# Patient Record
Sex: FEMALE | Race: WHITE | URBAN - METROPOLITAN AREA
[De-identification: names, ages, dates, MRNs, and addresses within clinical notes are randomized per-mention and may not be internally consistent; named-entity substitution may affect disease eponyms.]

---

## 2018-02-10 ENCOUNTER — EMERGENCY (EMERGENCY)
Facility: HOSPITAL | Age: 33
LOS: 0 days | Discharge: HOME | End: 2018-02-11

## 2018-02-10 VITALS
HEART RATE: 132 BPM | OXYGEN SATURATION: 97 % | DIASTOLIC BLOOD PRESSURE: 76 MMHG | SYSTOLIC BLOOD PRESSURE: 141 MMHG | RESPIRATION RATE: 18 BRPM | TEMPERATURE: 102 F

## 2018-02-10 DIAGNOSIS — J02.9 ACUTE PHARYNGITIS, UNSPECIFIED: ICD-10-CM

## 2018-02-10 DIAGNOSIS — M79.1 MYALGIA: ICD-10-CM

## 2018-02-10 DIAGNOSIS — Z79.899 OTHER LONG TERM (CURRENT) DRUG THERAPY: ICD-10-CM

## 2018-02-10 DIAGNOSIS — R50.9 FEVER, UNSPECIFIED: ICD-10-CM

## 2018-02-10 PROBLEM — Z00.00 ENCOUNTER FOR PREVENTIVE HEALTH EXAMINATION: Status: ACTIVE | Noted: 2018-02-10

## 2018-02-11 VITALS
DIASTOLIC BLOOD PRESSURE: 50 MMHG | OXYGEN SATURATION: 96 % | SYSTOLIC BLOOD PRESSURE: 96 MMHG | TEMPERATURE: 98 F | RESPIRATION RATE: 18 BRPM

## 2018-02-11 RX ORDER — SODIUM CHLORIDE 9 MG/ML
1000 INJECTION INTRAMUSCULAR; INTRAVENOUS; SUBCUTANEOUS
Qty: 0 | Refills: 0 | Status: COMPLETED | OUTPATIENT
Start: 2018-02-11 | End: 2018-02-11

## 2018-02-11 RX ORDER — ACETAMINOPHEN 500 MG
650 TABLET ORAL ONCE
Qty: 0 | Refills: 0 | Status: COMPLETED | OUTPATIENT
Start: 2018-02-11 | End: 2018-02-11

## 2018-02-11 RX ORDER — KETOROLAC TROMETHAMINE 30 MG/ML
30 SYRINGE (ML) INJECTION ONCE
Qty: 0 | Refills: 0 | Status: DISCONTINUED | OUTPATIENT
Start: 2018-02-11 | End: 2018-02-11

## 2018-02-11 RX ADMIN — Medication 30 MILLIGRAM(S): at 00:44

## 2018-02-11 RX ADMIN — SODIUM CHLORIDE 1000 MILLILITER(S): 9 INJECTION INTRAMUSCULAR; INTRAVENOUS; SUBCUTANEOUS at 01:30

## 2018-02-11 RX ADMIN — Medication 650 MILLIGRAM(S): at 00:43

## 2018-02-11 RX ADMIN — SODIUM CHLORIDE 1000 MILLILITER(S): 9 INJECTION INTRAMUSCULAR; INTRAVENOUS; SUBCUTANEOUS at 00:44

## 2018-02-11 NOTE — ED PROVIDER NOTE - NS ED ROS FT
Constitutional: + fever, chills  Heent: see hpi  Cardiovascular: (-) syncope  Integumentary: (-) rash  Musculoskeltal: + body aches, pains  Neurological: (-) altered mental status

## 2018-02-11 NOTE — ED PROVIDER NOTE - OBJECTIVE STATEMENT
32 yold female to ED with no signif med hx c/o fever, chills, sore throat, runny nose, body aches and pain x 1 day; pt currently being see with daughter with similar sx; pt denies sob, chest pain, n/v/diarrhea or back pain; did not get flu vaccine this season;

## 2018-02-11 NOTE — ED PROVIDER NOTE - ENMT, MLM
Airway patent, Nasal mucosa boggy. Mouth with normal mucosa. Throat has no vesicles, no oropharyngeal exudates and uvula is midline. + pharyngeal erythema

## 2018-02-11 NOTE — ED PROVIDER NOTE - MEDICAL DECISION MAKING DETAILS
pt with flu like sx; hydrated, antipyretics given; instructed in fever and hydration management; tamiflu prescribed;

## 2019-02-18 ENCOUNTER — EMERGENCY (EMERGENCY)
Facility: HOSPITAL | Age: 34
LOS: 0 days | Discharge: HOME | End: 2019-02-18
Attending: EMERGENCY MEDICINE | Admitting: EMERGENCY MEDICINE

## 2019-02-18 VITALS
HEART RATE: 69 BPM | TEMPERATURE: 96 F | RESPIRATION RATE: 18 BRPM | DIASTOLIC BLOOD PRESSURE: 71 MMHG | SYSTOLIC BLOOD PRESSURE: 117 MMHG | OXYGEN SATURATION: 97 %

## 2019-02-18 DIAGNOSIS — K08.89 OTHER SPECIFIED DISORDERS OF TEETH AND SUPPORTING STRUCTURES: ICD-10-CM

## 2019-02-18 DIAGNOSIS — K02.9 DENTAL CARIES, UNSPECIFIED: ICD-10-CM

## 2019-02-18 DIAGNOSIS — Z79.899 OTHER LONG TERM (CURRENT) DRUG THERAPY: ICD-10-CM

## 2019-02-18 NOTE — ED PROVIDER NOTE - PROGRESS NOTE DETAILS
32 yo F with poor dental hygiene and multiple dental caries presenting with pain and caries to tooth #20. No systemic sx. No s/sx of marija angina, abscess. Discussed risk and benefit of dental block but pt declined block and refused any tylenol/motrin here. Sent rx for augmentin to pharmacy for gum swelling and given dental clinic f/u tomorrow. Pt verbalized understanding and was agreeable with plan to dc home.

## 2019-02-18 NOTE — ED PROVIDER NOTE - PHYSICAL EXAMINATION
CONSTITUTIONAL: well developed, nontoxic appearing, in no acute distress, speaking in full sentences  SKIN: warm, dry, no rash, cap refill < 2 seconds  HEENT: normocephalic, atraumatic, no conjunctival erythema, moist mucous membranes, patent airway, multiple dental caries, poor dental hygiene, significant dental caries on tooth #20, pain with palpation of #20 with surrounding gum swelling, no fluctuance, no induration, no tenderness/swelling along mandibular floor, no loose teeth  NECK: supple, no masses, no cervical lymphadenopathy  CV:  regular rate, regular rhythm  RESP: normal work of breathing  ABD: soft, nontender, nondistended, no rebound, no guarding  MSK: normal ROM, no cyanosis, no edema  NEURO: alert, oriented, grossly unremarkable  PSYCH: cooperative, appropriate

## 2019-02-18 NOTE — ED PROVIDER NOTE - OBJECTIVE STATEMENT
32 yo F with no PMHx who presents with dental pain x past several days, worse with chewing. Has some swelling around gum but no pus or recent trauma. Has been taking tylenol and advil without relief in pain. No f, ch, drooling, difficulty swallowing, n, v. Has not seen a dentist in a long time and tried to go to dental clinic today but it was closed for holiday so came to ED.

## 2019-02-18 NOTE — ED PROVIDER NOTE - NSFOLLOWUPCLINICS_GEN_ALL_ED_FT
[facility]  Dental  [address] [place] [id number]  Phone: [phone]  Fax:   Follow Up Time: 1-3 Days

## 2019-02-18 NOTE — ED PROVIDER NOTE - NS ED ROS FT
GEN:  no fever, no chills, no fatigue  NEURO:  no headache, no dizziness  ENT: no sore throat, no runny nose, + teeth pain  NECK: no neck pain  CV:  no chest pain, no SOB  RESP:  no dyspnea, no cough  GI:  no nausea, no vomiting, no abdominal pain  :  no dysuria, no urinary frequency, no hematuria  MSK:  no joint pain, no edema  SKIN:  no rash, no bruising

## 2020-02-29 ENCOUNTER — TRANSCRIPTION ENCOUNTER (OUTPATIENT)
Age: 35
End: 2020-02-29

## 2022-03-23 ENCOUNTER — INPATIENT (INPATIENT)
Facility: HOSPITAL | Age: 37
LOS: 0 days | Discharge: HOME | End: 2022-03-24
Attending: OBSTETRICS & GYNECOLOGY | Admitting: OBSTETRICS & GYNECOLOGY

## 2022-03-23 VITALS
SYSTOLIC BLOOD PRESSURE: 101 MMHG | RESPIRATION RATE: 18 BRPM | HEART RATE: 67 BPM | WEIGHT: 182.98 LBS | HEIGHT: 63 IN | DIASTOLIC BLOOD PRESSURE: 70 MMHG | TEMPERATURE: 98 F

## 2022-03-23 DIAGNOSIS — Z98.890 OTHER SPECIFIED POSTPROCEDURAL STATES: Chronic | ICD-10-CM

## 2022-03-23 LAB
AMPHET UR-MCNC: NEGATIVE — SIGNIFICANT CHANGE UP
APPEARANCE UR: CLEAR — SIGNIFICANT CHANGE UP
BARBITURATES UR SCN-MCNC: NEGATIVE — SIGNIFICANT CHANGE UP
BASOPHILS # BLD AUTO: 0.01 K/UL — SIGNIFICANT CHANGE UP (ref 0–0.2)
BASOPHILS NFR BLD AUTO: 0.1 % — SIGNIFICANT CHANGE UP (ref 0–1)
BENZODIAZ UR-MCNC: NEGATIVE — SIGNIFICANT CHANGE UP
BILIRUB UR-MCNC: NEGATIVE — SIGNIFICANT CHANGE UP
BLD GP AB SCN SERPL QL: SIGNIFICANT CHANGE UP
BUPRENORPHINE SCREEN, URINE RESULT: NEGATIVE — SIGNIFICANT CHANGE UP
COCAINE METAB.OTHER UR-MCNC: NEGATIVE — SIGNIFICANT CHANGE UP
COLOR SPEC: YELLOW — SIGNIFICANT CHANGE UP
DIFF PNL FLD: NEGATIVE — SIGNIFICANT CHANGE UP
EOSINOPHIL # BLD AUTO: 0.1 K/UL — SIGNIFICANT CHANGE UP (ref 0–0.7)
EOSINOPHIL NFR BLD AUTO: 1.1 % — SIGNIFICANT CHANGE UP (ref 0–8)
FENTANYL UR QL: NEGATIVE — SIGNIFICANT CHANGE UP
GLUCOSE UR QL: NEGATIVE — SIGNIFICANT CHANGE UP
HCT VFR BLD CALC: 35.6 % — LOW (ref 37–47)
HGB BLD-MCNC: 12.2 G/DL — SIGNIFICANT CHANGE UP (ref 12–16)
HIV 1 & 2 AB SERPL IA.RAPID: SIGNIFICANT CHANGE UP
IMM GRANULOCYTES NFR BLD AUTO: 0.3 % — SIGNIFICANT CHANGE UP (ref 0.1–0.3)
KETONES UR-MCNC: NEGATIVE — SIGNIFICANT CHANGE UP
L&D DRUG SCREEN, URINE: SIGNIFICANT CHANGE UP
LEUKOCYTE ESTERASE UR-ACNC: NEGATIVE — SIGNIFICANT CHANGE UP
LYMPHOCYTES # BLD AUTO: 1.47 K/UL — SIGNIFICANT CHANGE UP (ref 1.2–3.4)
LYMPHOCYTES # BLD AUTO: 15.5 % — LOW (ref 20.5–51.1)
MCHC RBC-ENTMCNC: 32.4 PG — HIGH (ref 27–31)
MCHC RBC-ENTMCNC: 34.3 G/DL — SIGNIFICANT CHANGE UP (ref 32–37)
MCV RBC AUTO: 94.4 FL — SIGNIFICANT CHANGE UP (ref 81–99)
METHADONE UR-MCNC: NEGATIVE — SIGNIFICANT CHANGE UP
MONOCYTES # BLD AUTO: 0.93 K/UL — HIGH (ref 0.1–0.6)
MONOCYTES NFR BLD AUTO: 9.8 % — HIGH (ref 1.7–9.3)
NEUTROPHILS # BLD AUTO: 6.96 K/UL — HIGH (ref 1.4–6.5)
NEUTROPHILS NFR BLD AUTO: 73.2 % — SIGNIFICANT CHANGE UP (ref 42.2–75.2)
NITRITE UR-MCNC: NEGATIVE — SIGNIFICANT CHANGE UP
NRBC # BLD: 0 /100 WBCS — SIGNIFICANT CHANGE UP (ref 0–0)
OPIATES UR-MCNC: NEGATIVE — SIGNIFICANT CHANGE UP
OXYCODONE UR-MCNC: NEGATIVE — SIGNIFICANT CHANGE UP
PCP UR-MCNC: NEGATIVE — SIGNIFICANT CHANGE UP
PH UR: 6.5 — SIGNIFICANT CHANGE UP (ref 5–8)
PLATELET # BLD AUTO: 241 K/UL — SIGNIFICANT CHANGE UP (ref 130–400)
PRENATAL SYPHILIS TEST: SIGNIFICANT CHANGE UP
PROPOXYPHENE QUALITATIVE URINE RESULT: NEGATIVE — SIGNIFICANT CHANGE UP
PROT UR-MCNC: SIGNIFICANT CHANGE UP
RBC # BLD: 3.77 M/UL — LOW (ref 4.2–5.4)
RBC # FLD: 13.3 % — SIGNIFICANT CHANGE UP (ref 11.5–14.5)
SARS-COV-2 RNA SPEC QL NAA+PROBE: SIGNIFICANT CHANGE UP
SP GR SPEC: 1.02 — SIGNIFICANT CHANGE UP (ref 1.01–1.03)
UROBILINOGEN FLD QL: SIGNIFICANT CHANGE UP
WBC # BLD: 9.5 K/UL — SIGNIFICANT CHANGE UP (ref 4.8–10.8)
WBC # FLD AUTO: 9.5 K/UL — SIGNIFICANT CHANGE UP (ref 4.8–10.8)

## 2022-03-23 RX ORDER — HYDROCORTISONE 1 %
1 OINTMENT (GRAM) TOPICAL EVERY 6 HOURS
Refills: 0 | Status: DISCONTINUED | OUTPATIENT
Start: 2022-03-23 | End: 2022-03-24

## 2022-03-23 RX ORDER — DIPHENHYDRAMINE HCL 50 MG
25 CAPSULE ORAL EVERY 6 HOURS
Refills: 0 | Status: DISCONTINUED | OUTPATIENT
Start: 2022-03-23 | End: 2022-03-24

## 2022-03-23 RX ORDER — NALOXONE HYDROCHLORIDE 4 MG/.1ML
0.1 SPRAY NASAL
Refills: 0 | Status: DISCONTINUED | OUTPATIENT
Start: 2022-03-23 | End: 2022-03-24

## 2022-03-23 RX ORDER — ACETAMINOPHEN 500 MG
975 TABLET ORAL
Refills: 0 | Status: DISCONTINUED | OUTPATIENT
Start: 2022-03-23 | End: 2022-03-24

## 2022-03-23 RX ORDER — IBUPROFEN 200 MG
600 TABLET ORAL EVERY 6 HOURS
Refills: 0 | Status: DISCONTINUED | OUTPATIENT
Start: 2022-03-23 | End: 2022-03-24

## 2022-03-23 RX ORDER — PRAMOXINE HYDROCHLORIDE 150 MG/15G
1 AEROSOL, FOAM RECTAL EVERY 4 HOURS
Refills: 0 | Status: DISCONTINUED | OUTPATIENT
Start: 2022-03-23 | End: 2022-03-24

## 2022-03-23 RX ORDER — AER TRAVELER 0.5 G/1
1 SOLUTION RECTAL; TOPICAL EVERY 4 HOURS
Refills: 0 | Status: DISCONTINUED | OUTPATIENT
Start: 2022-03-23 | End: 2022-03-24

## 2022-03-23 RX ORDER — OXYCODONE HYDROCHLORIDE 5 MG/1
5 TABLET ORAL
Refills: 0 | Status: DISCONTINUED | OUTPATIENT
Start: 2022-03-23 | End: 2022-03-24

## 2022-03-23 RX ORDER — BENZOCAINE 10 %
1 GEL (GRAM) MUCOUS MEMBRANE EVERY 6 HOURS
Refills: 0 | Status: DISCONTINUED | OUTPATIENT
Start: 2022-03-23 | End: 2022-03-24

## 2022-03-23 RX ORDER — ONDANSETRON 8 MG/1
4 TABLET, FILM COATED ORAL EVERY 6 HOURS
Refills: 0 | Status: DISCONTINUED | OUTPATIENT
Start: 2022-03-23 | End: 2022-03-24

## 2022-03-23 RX ORDER — DIBUCAINE 1 %
1 OINTMENT (GRAM) RECTAL EVERY 6 HOURS
Refills: 0 | Status: DISCONTINUED | OUTPATIENT
Start: 2022-03-23 | End: 2022-03-24

## 2022-03-23 RX ORDER — SIMETHICONE 80 MG/1
80 TABLET, CHEWABLE ORAL EVERY 4 HOURS
Refills: 0 | Status: DISCONTINUED | OUTPATIENT
Start: 2022-03-23 | End: 2022-03-24

## 2022-03-23 RX ORDER — MAGNESIUM HYDROXIDE 400 MG/1
30 TABLET, CHEWABLE ORAL
Refills: 0 | Status: DISCONTINUED | OUTPATIENT
Start: 2022-03-23 | End: 2022-03-24

## 2022-03-23 RX ORDER — OXYCODONE HYDROCHLORIDE 5 MG/1
5 TABLET ORAL ONCE
Refills: 0 | Status: DISCONTINUED | OUTPATIENT
Start: 2022-03-23 | End: 2022-03-24

## 2022-03-23 RX ORDER — DEXAMETHASONE 0.5 MG/5ML
4 ELIXIR ORAL EVERY 6 HOURS
Refills: 0 | Status: DISCONTINUED | OUTPATIENT
Start: 2022-03-23 | End: 2022-03-24

## 2022-03-23 RX ORDER — TETANUS TOXOID, REDUCED DIPHTHERIA TOXOID AND ACELLULAR PERTUSSIS VACCINE, ADSORBED 5; 2.5; 8; 8; 2.5 [IU]/.5ML; [IU]/.5ML; UG/.5ML; UG/.5ML; UG/.5ML
0.5 SUSPENSION INTRAMUSCULAR ONCE
Refills: 0 | Status: DISCONTINUED | OUTPATIENT
Start: 2022-03-23 | End: 2022-03-24

## 2022-03-23 RX ORDER — SODIUM CHLORIDE 9 MG/ML
1000 INJECTION, SOLUTION INTRAVENOUS
Refills: 0 | Status: DISCONTINUED | OUTPATIENT
Start: 2022-03-23 | End: 2022-03-23

## 2022-03-23 RX ORDER — KETOROLAC TROMETHAMINE 30 MG/ML
30 SYRINGE (ML) INJECTION ONCE
Refills: 0 | Status: DISCONTINUED | OUTPATIENT
Start: 2022-03-23 | End: 2022-03-24

## 2022-03-23 RX ORDER — LANOLIN
1 OINTMENT (GRAM) TOPICAL EVERY 6 HOURS
Refills: 0 | Status: DISCONTINUED | OUTPATIENT
Start: 2022-03-23 | End: 2022-03-24

## 2022-03-23 RX ORDER — FENTANYL/BUPIVACAINE/NS/PF 2MCG/ML-.1
250 PLASTIC BAG, INJECTION (ML) INJECTION
Refills: 0 | Status: DISCONTINUED | OUTPATIENT
Start: 2022-03-23 | End: 2022-03-24

## 2022-03-23 RX ORDER — IBUPROFEN 200 MG
600 TABLET ORAL EVERY 6 HOURS
Refills: 0 | Status: COMPLETED | OUTPATIENT
Start: 2022-03-23 | End: 2023-02-19

## 2022-03-23 RX ORDER — CITRIC ACID/SODIUM CITRATE 300-500 MG
15 SOLUTION, ORAL ORAL EVERY 6 HOURS
Refills: 0 | Status: DISCONTINUED | OUTPATIENT
Start: 2022-03-23 | End: 2022-03-23

## 2022-03-23 RX ORDER — OXYTOCIN 10 UNIT/ML
333.33 VIAL (ML) INJECTION
Qty: 20 | Refills: 0 | Status: DISCONTINUED | OUTPATIENT
Start: 2022-03-23 | End: 2022-03-23

## 2022-03-23 RX ORDER — OXYTOCIN 10 UNIT/ML
333.33 VIAL (ML) INJECTION
Qty: 20 | Refills: 0 | Status: DISCONTINUED | OUTPATIENT
Start: 2022-03-23 | End: 2022-03-24

## 2022-03-23 RX ORDER — INFLUENZA VIRUS VACCINE 15; 15; 15; 15 UG/.5ML; UG/.5ML; UG/.5ML; UG/.5ML
0.5 SUSPENSION INTRAMUSCULAR ONCE
Refills: 0 | Status: COMPLETED | OUTPATIENT
Start: 2022-03-23 | End: 2022-03-23

## 2022-03-23 RX ORDER — SODIUM CHLORIDE 9 MG/ML
3 INJECTION INTRAMUSCULAR; INTRAVENOUS; SUBCUTANEOUS EVERY 8 HOURS
Refills: 0 | Status: DISCONTINUED | OUTPATIENT
Start: 2022-03-23 | End: 2022-03-24

## 2022-03-23 RX ADMIN — Medication 1 TABLET(S): at 15:20

## 2022-03-23 RX ADMIN — Medication 600 MILLIGRAM(S): at 18:56

## 2022-03-23 RX ADMIN — SODIUM CHLORIDE 3 MILLILITER(S): 9 INJECTION INTRAMUSCULAR; INTRAVENOUS; SUBCUTANEOUS at 15:02

## 2022-03-23 RX ADMIN — Medication 600 MILLIGRAM(S): at 18:13

## 2022-03-23 RX ADMIN — Medication 975 MILLIGRAM(S): at 20:39

## 2022-03-23 RX ADMIN — Medication 1000 MILLIUNIT(S)/MIN: at 12:55

## 2022-03-23 RX ADMIN — Medication 975 MILLIGRAM(S): at 15:20

## 2022-03-23 RX ADMIN — Medication 975 MILLIGRAM(S): at 17:00

## 2022-03-23 NOTE — OB RN DELIVERY SUMMARY - NSSELHIDDEN_OBGYN_ALL_OB_FT
[NS_DeliveryAttending1_OBGYN_ALL_OB_FT:[id number]],[NS_DeliveryAssist1_OBGYN_ALL_OB_FT:[id number]],[NS_DeliveryRN_OBGYN_ALL_OB_FT:[id number]=]

## 2022-03-23 NOTE — OB PROVIDER H&P - ASSESSMENT
36y.o.  @ 38.4wks SROM in labor  Admit to L&D  IVF, labs  Continuous efm and toco  Pain management PRN  Anticipate   Dr. Chavez  Aware

## 2022-03-23 NOTE — OB PROVIDER H&P - NS_CTXBYPALP_OBGYN_ALL_OB
HTN  cont current meds for now  uptitrate labetalol and nifedipine as needed     CKD  s/p transplant  fu with renal     Sz  fu with neurology Strong

## 2022-03-23 NOTE — OB PROVIDER H&P - NSHPPHYSICALEXAM_GEN_ALL_CORE
T(C): 36.7 (03-23-22 @ 09:12), Max: 36.7 (03-23-22 @ 09:12)  HR: 67 (03-23-22 @ 09:12) (67 - 67)  BP: 101/70 (03-23-22 @ 09:12) (101/70 - 101/70)  RR: 18 (03-23-22 @ 09:12) (18 - 18)    VE: Grossly ruptured, clear fluid  5/90/-2  Ctx: q 2-3 min  FHR: 120 Cat I

## 2022-03-23 NOTE — OB RN DELIVERY SUMMARY - NS_LABORROOM_OBGYN_ALL_OB_FT
Patient Eli Lewis states they have not traveled to [facility] within the last 14 days. 1

## 2022-03-23 NOTE — OB PROVIDER DELIVERY SUMMARY - NSPROVIDERDELIVERYNOTE_OBGYN_ALL_OB_FT
Pt was fully dilated, mother was pushing with good maternal effort. Fetal head brought to the perineum and delivery OA and restituted to HEDY. No nuchal cord. With good maternal effort, the shoulders delivered with gentle downward traction. Remaining body of the infant delivered atraumatically.  handed to the mother and the RN. Delayed cord clamping performed. Cord was clamped and cut. Cord gases and cord blood obtained. Placenta delivered spontaneously. Pitocin administered. 1st degree perineal laceration noted, repaired using 3.0 chromic in the usual sterile fashion with good hemostasis.     Laceration: 1st degree perineal     EBL: 300cc     Dr. Chavez present for the delivery

## 2022-03-23 NOTE — PROCEDURE NOTE - ADDITIONAL PROCEDURE DETAILS
0950 Patient requesting epidural  Procedure explained to patient, Risks/Benefits/Alternatives  Consent obtained  L4-L5 accessed with 17ga Toughy  MARGARITA at 8cm  27Ga spinal used to obtain +CSF  1ml 0.25% Bupivacaine administered with good effect  Catheter threaded to 12cm, Negative aspiration for CSF  Lidocaine 1.5% With Epinephrine administered 2ml with negative result  Pump started at 15ml/hr  VSS/FHR WNL  Patient instructed to request anesthesia with any discomforts/concerns 0950 Patient requesting epidural  Procedure explained to patient, Risks/Benefits/Alternatives  Consent obtained  L4-L5 accessed with 17ga Toughy  MARGARITA at 8cm  27Ga spinal used to obtain +CSF  1ml 0.25% Bupivacaine administered with good effect  Catheter threaded to 12cm, Negative aspiration for CSF  Lidocaine 1.5% With Epinephrine administered 2ml with negative result  Pump started at 15ml/hr  VSS/FHR WNL  Patient instructed to request anesthesia with any discomforts/concerns      Vital signs stable  No anesthesia complications from labor epidural  Patient discharged to OB post partum care as per policy

## 2022-03-23 NOTE — OB PROVIDER H&P - HISTORY OF PRESENT ILLNESS
Pt is a 36y.o.  @ 38.4wks presents c/o ROM @ 0700am, clear fluid. Pt reports ctx began at same time. Pt denies VB and reports good FM

## 2022-03-23 NOTE — OB PROVIDER DELIVERY SUMMARY - NSSELHIDDEN_OBGYN_ALL_OB_FT
[NS_DeliveryAttending1_OBGYN_ALL_OB_FT:[id number]],[NS_DeliveryAssist1_OBGYN_ALL_OB_FT:[id number]],[NS_DeliveryRN_OBGYN_ALL_OB_FT:[id number]=]

## 2022-03-24 ENCOUNTER — TRANSCRIPTION ENCOUNTER (OUTPATIENT)
Age: 37
End: 2022-03-24

## 2022-03-24 VITALS
DIASTOLIC BLOOD PRESSURE: 60 MMHG | TEMPERATURE: 96 F | SYSTOLIC BLOOD PRESSURE: 120 MMHG | HEART RATE: 68 BPM | RESPIRATION RATE: 18 BRPM

## 2022-03-24 LAB
BLD GP AB SCN SERPL QL: SIGNIFICANT CHANGE UP
HCT VFR BLD CALC: 29.3 % — LOW (ref 37–47)
HGB BLD-MCNC: 9.9 G/DL — LOW (ref 12–16)
MCHC RBC-ENTMCNC: 32.4 PG — HIGH (ref 27–31)
MCHC RBC-ENTMCNC: 33.8 G/DL — SIGNIFICANT CHANGE UP (ref 32–37)
MCV RBC AUTO: 95.8 FL — SIGNIFICANT CHANGE UP (ref 81–99)
NRBC # BLD: 0 /100 WBCS — SIGNIFICANT CHANGE UP (ref 0–0)
PLATELET # BLD AUTO: 206 K/UL — SIGNIFICANT CHANGE UP (ref 130–400)
RBC # BLD: 3.06 M/UL — LOW (ref 4.2–5.4)
RBC # FLD: 13.4 % — SIGNIFICANT CHANGE UP (ref 11.5–14.5)
WBC # BLD: 13.11 K/UL — HIGH (ref 4.8–10.8)
WBC # FLD AUTO: 13.11 K/UL — HIGH (ref 4.8–10.8)

## 2022-03-24 RX ORDER — ACETAMINOPHEN 500 MG
3 TABLET ORAL
Qty: 0 | Refills: 0 | DISCHARGE
Start: 2022-03-24

## 2022-03-24 RX ORDER — IBUPROFEN 200 MG
1 TABLET ORAL
Qty: 0 | Refills: 0 | DISCHARGE
Start: 2022-03-24

## 2022-03-24 RX ADMIN — Medication 600 MILLIGRAM(S): at 18:19

## 2022-03-24 RX ADMIN — Medication 600 MILLIGRAM(S): at 12:24

## 2022-03-24 RX ADMIN — SODIUM CHLORIDE 3 MILLILITER(S): 9 INJECTION INTRAMUSCULAR; INTRAVENOUS; SUBCUTANEOUS at 12:44

## 2022-03-24 RX ADMIN — Medication 600 MILLIGRAM(S): at 01:55

## 2022-03-24 RX ADMIN — Medication 975 MILLIGRAM(S): at 15:42

## 2022-03-24 RX ADMIN — Medication 975 MILLIGRAM(S): at 09:02

## 2022-03-24 RX ADMIN — Medication 1 TABLET(S): at 11:31

## 2022-03-24 RX ADMIN — Medication 600 MILLIGRAM(S): at 11:31

## 2022-03-24 RX ADMIN — Medication 975 MILLIGRAM(S): at 08:04

## 2022-03-24 RX ADMIN — Medication 975 MILLIGRAM(S): at 15:45

## 2022-03-24 NOTE — DISCHARGE NOTE OB - MEDICATION SUMMARY - MEDICATIONS TO TAKE
Anesthetic History   No history of anesthetic complications            Review of Systems / Medical History  Patient summary reviewed and pertinent labs reviewed    Pulmonary  Within defined limits                 Neuro/Psych   Within defined limits           Cardiovascular              Hyperlipidemia  Pertinent negatives: No valvular problems/murmurs and angina  Exercise tolerance: >4 METS     GI/Hepatic/Renal     GERD           Endo/Other        Cancer     Other Findings              Physical Exam    Airway  Mallampati: II  TM Distance: 4 - 6 cm  Neck ROM: normal range of motion   Mouth opening: Normal     Cardiovascular    Rhythm: regular  Rate: normal      Pertinent negatives: No murmur, JVD and peripheral edema   Dental  No notable dental hx       Pulmonary  Breath sounds clear to auscultation               Abdominal         Other Findings            Anesthetic Plan    ASA: 2  Anesthesia type: general          Induction: Intravenous  Anesthetic plan and risks discussed with: Patient I will START or STAY ON the medications listed below when I get home from the hospital:    ibuprofen 600 mg oral tablet  -- 1 tab(s) by mouth every 6 hours, As Needed  -- Indication: For pain    acetaminophen 325 mg oral tablet  -- 3 tab(s) by mouth every 6 hours, As Needed  -- Indication: For pain    Prenatal Multivitamins with Folic Acid 1 mg oral tablet  -- 1 tab(s) by mouth once a day  -- Indication: For postpartum

## 2022-03-24 NOTE — DISCHARGE NOTE OB - PATIENT PORTAL LINK FT
You can access the FollowMyHealth Patient Portal offered by [facility] by registering at the following website: [email or web address]. By joining [facility]’s FollowMyHealth portal, you will also be able to view your health information using other applications (apps) compatible with our system.

## 2022-03-24 NOTE — PROGRESS NOTE ADULT - SUBJECTIVE AND OBJECTIVE BOX
PGY1 Note    Patient seen and examined. Pain well controlled at this time. No complaints at this time. Denies fever, chills, nausea, vomiting, chest pain, shortness of breath, severe abdominal pain, heavy vaginal bleeding. Patient is ambulating, tolerating PO, and voiding without difficulty.     Physical Exam  Vital Signs Last 24 Hrs  T(C): 35.8 (24 Mar 2022 15:35), Max: 36.7 (23 Mar 2022 23:32)  T(F): 96.5 (24 Mar 2022 15:35), Max: 98.1 (23 Mar 2022 23:32)  HR: 68 (24 Mar 2022 15:35) (61 - 68)  BP: 120/60 (24 Mar 2022 15:35) (91/50 - 123/74)  RR: 18 (24 Mar 2022 15:35) (18 - 18)  Gen: AAOx3, NAD  Cardio: RRR, S1S2, no M/R/G  Resp: CTAB  Ext: No calf tenderness, no swelling  Fundus: firm, below umbilicus. Nontender.   Abd: Soft, nontender, nondistended, BS+  Lochia: moderate lochia      Labs:                        9.9    13.11 )-----------( 206      ( 24 Mar 2022 06:00 )             29.3                         12.2   9.50  )-----------( 241      ( 23 Mar 2022 09:50 )             35.6          MEDICATIONS  (STANDING):  acetaminophen     Tablet .. 975 milliGRAM(s) Oral <User Schedule>  diphtheria/tetanus/pertussis (acellular) Vaccine (ADAcel) 0.5 milliLiter(s) IntraMuscular once  fentanyl (2 MICROgram(s)/mL) + bupivacaine 0.1%  in Sodium Chloride 0.9% Epidural Drip 250 milliLiter(s) Epidural Drip <Continuous>  ibuprofen  Tablet. 600 milliGRAM(s) Oral every 6 hours  ketorolac   Injectable 30 milliGRAM(s) IV Push once  prenatal multivitamin 1 Tablet(s) Oral daily  sodium chloride 0.9% lock flush 3 milliLiter(s) IV Push every 8 hours    MEDICATIONS  (PRN):  benzocaine 20%/menthol 0.5% Spray 1 Spray(s) Topical every 6 hours PRN for Perineal discomfort  dexAMETHasone  Injectable 4 milliGRAM(s) IV Push every 6 hours PRN Nausea  dibucaine 1% Ointment 1 Application(s) Topical every 6 hours PRN Perineal discomfort  diphenhydrAMINE 25 milliGRAM(s) Oral every 6 hours PRN Pruritus  hydrocortisone 1% Cream 1 Application(s) Topical every 6 hours PRN Moderate Pain (4-6)  lanolin Ointment 1 Application(s) Topical every 6 hours PRN nipple soreness  magnesium hydroxide Suspension 30 milliLiter(s) Oral two times a day PRN Constipation  naloxone Injectable 0.1 milliGRAM(s) IV Push every 3 minutes PRN For ANY of the following changes in patient status:  A. RR LESS THAN 10 breaths per minute, B. Oxygen saturation LESS THAN 90%, C. Sedation score of 6  ondansetron Injectable 4 milliGRAM(s) IV Push every 6 hours PRN Nausea  oxyCODONE    IR 5 milliGRAM(s) Oral every 3 hours PRN Moderate to Severe Pain (4-10)  oxyCODONE    IR 5 milliGRAM(s) Oral once PRN Moderate to Severe Pain (4-10)  pramoxine 1%/zinc 5% Cream 1 Application(s) Topical every 4 hours PRN Moderate Pain (4-6)  simethicone 80 milliGRAM(s) Chew every 4 hours PRN Gas  witch hazel Pads 1 Application(s) Topical every 4 hours PRN Perineal discomfort

## 2022-03-24 NOTE — DISCHARGE NOTE OB - CARE PROVIDER_API CALL
Parag Chavez (MD)  Obstetrics and Gynecology  [address] [place] [address]  Phone: [phone]  Fax: [phone]  Established Patient  Follow Up Time: Routine

## 2022-03-24 NOTE — PROGRESS NOTE ADULT - ASSESSMENT
36y now P2 S/P  PPD#1, recovering well  - encourage ambulation  - PO hydration  - Continue diet as tolerated   - Monitor vitals and bleeding  - anticipate d/c home today and is instructed to follow up in 6 weeks.     Dr. Chavez and Dr. Larkin to be aware

## 2022-03-24 NOTE — DISCHARGE NOTE OB - NS MD DC FALL RISK RISK
For information on Fall & Injury Prevention, visit: [email or web address] OR  [email or web address] OR  https://www.cdc.gov/steadi/patient.html

## 2022-03-24 NOTE — DISCHARGE NOTE OB - MEDICATION SUMMARY - MEDICATIONS TO STOP TAKING
I will STOP taking the medications listed below when I get home from the hospital:    oseltamivir 75 mg oral capsule  -- 1 cap(s) by mouth 2 times a day   -- Check with your doctor before becoming pregnant.  Finish all this medication unless otherwise directed by prescriber.    amoxicillin-clavulanate 875 mg-125 mg oral tablet  -- 1 tab(s) by mouth 2 times a day   -- Finish all this medication unless otherwise directed by prescriber.  Take with food or milk.

## 2022-03-28 ENCOUNTER — NON-APPOINTMENT (OUTPATIENT)
Age: 37
End: 2022-03-28

## 2022-03-30 DIAGNOSIS — Z3A.38 38 WEEKS GESTATION OF PREGNANCY: ICD-10-CM

## 2022-05-23 ENCOUNTER — NON-APPOINTMENT (OUTPATIENT)
Age: 37
End: 2022-05-23

## 2023-01-06 NOTE — OB PROVIDER DELIVERY SUMMARY - NSPOSITIONATDELIA_OBGYN_ALL_OB
Occiput Anterior Ivermectin Pregnancy And Lactation Text: This medication is Pregnancy Category C and it isn't known if it is safe during pregnancy. It is also excreted in breast milk.

## 2024-02-11 ENCOUNTER — INPATIENT (INPATIENT)
Facility: HOSPITAL | Age: 39
LOS: 2 days | Discharge: HOME | DRG: 263 | End: 2024-02-14
Attending: STUDENT IN AN ORGANIZED HEALTH CARE EDUCATION/TRAINING PROGRAM | Admitting: STUDENT IN AN ORGANIZED HEALTH CARE EDUCATION/TRAINING PROGRAM
Payer: MEDICAID

## 2024-02-11 VITALS
SYSTOLIC BLOOD PRESSURE: 130 MMHG | HEART RATE: 72 BPM | TEMPERATURE: 97 F | WEIGHT: 184.97 LBS | DIASTOLIC BLOOD PRESSURE: 60 MMHG | RESPIRATION RATE: 17 BRPM | OXYGEN SATURATION: 99 % | HEIGHT: 64 IN

## 2024-02-11 DIAGNOSIS — Z98.890 OTHER SPECIFIED POSTPROCEDURAL STATES: Chronic | ICD-10-CM

## 2024-02-11 DIAGNOSIS — K80.50 CALCULUS OF BILE DUCT WITHOUT CHOLANGITIS OR CHOLECYSTITIS WITHOUT OBSTRUCTION: ICD-10-CM

## 2024-02-11 LAB
ALBUMIN SERPL ELPH-MCNC: 4.3 G/DL — SIGNIFICANT CHANGE UP (ref 3.5–5.2)
ALP SERPL-CCNC: 141 U/L — HIGH (ref 30–115)
ALT FLD-CCNC: 281 U/L — HIGH (ref 0–41)
ANION GAP SERPL CALC-SCNC: 10 MMOL/L — SIGNIFICANT CHANGE UP (ref 7–14)
APPEARANCE UR: ABNORMAL
APTT BLD: 38 SEC — SIGNIFICANT CHANGE UP (ref 27–39.2)
AST SERPL-CCNC: 122 U/L — HIGH (ref 0–41)
BACTERIA # UR AUTO: NEGATIVE /HPF — SIGNIFICANT CHANGE UP
BASOPHILS # BLD AUTO: 0.01 K/UL — SIGNIFICANT CHANGE UP (ref 0–0.2)
BASOPHILS NFR BLD AUTO: 0.1 % — SIGNIFICANT CHANGE UP (ref 0–1)
BILIRUB DIRECT SERPL-MCNC: 2.8 MG/DL — HIGH (ref 0–0.3)
BILIRUB INDIRECT FLD-MCNC: 0.8 MG/DL — SIGNIFICANT CHANGE UP (ref 0.2–1.2)
BILIRUB SERPL-MCNC: 3.6 MG/DL — HIGH (ref 0.2–1.2)
BILIRUB UR-MCNC: ABNORMAL
BLD GP AB SCN SERPL QL: SIGNIFICANT CHANGE UP
BUN SERPL-MCNC: 9 MG/DL — LOW (ref 10–20)
CALCIUM SERPL-MCNC: 8.7 MG/DL — SIGNIFICANT CHANGE UP (ref 8.4–10.5)
CAST: 5 /LPF — HIGH (ref 0–4)
CHLORIDE SERPL-SCNC: 100 MMOL/L — SIGNIFICANT CHANGE UP (ref 98–110)
CO2 SERPL-SCNC: 25 MMOL/L — SIGNIFICANT CHANGE UP (ref 17–32)
COLOR SPEC: SIGNIFICANT CHANGE UP
CREAT SERPL-MCNC: 0.8 MG/DL — SIGNIFICANT CHANGE UP (ref 0.7–1.5)
DIFF PNL FLD: NEGATIVE — SIGNIFICANT CHANGE UP
EGFR: 98 ML/MIN/1.73M2 — SIGNIFICANT CHANGE UP
EOSINOPHIL # BLD AUTO: 0.17 K/UL — SIGNIFICANT CHANGE UP (ref 0–0.7)
EOSINOPHIL NFR BLD AUTO: 2.2 % — SIGNIFICANT CHANGE UP (ref 0–8)
GLUCOSE SERPL-MCNC: 90 MG/DL — SIGNIFICANT CHANGE UP (ref 70–99)
GLUCOSE UR QL: NEGATIVE MG/DL — SIGNIFICANT CHANGE UP
GRAN CASTS # UR COMP ASSIST: PRESENT
HCG SERPL QL: NEGATIVE — SIGNIFICANT CHANGE UP
HCT VFR BLD CALC: 37.2 % — SIGNIFICANT CHANGE UP (ref 37–47)
HGB BLD-MCNC: 12.5 G/DL — SIGNIFICANT CHANGE UP (ref 12–16)
HYALINE CASTS # UR AUTO: 2 /LPF — SIGNIFICANT CHANGE UP (ref 0–4)
IMM GRANULOCYTES NFR BLD AUTO: 0.3 % — SIGNIFICANT CHANGE UP (ref 0.1–0.3)
INR BLD: 1.02 RATIO — SIGNIFICANT CHANGE UP (ref 0.65–1.3)
KETONES UR-MCNC: 80 MG/DL
LEUKOCYTE ESTERASE UR-ACNC: ABNORMAL
LIDOCAIN IGE QN: 29 U/L — SIGNIFICANT CHANGE UP (ref 7–60)
LYMPHOCYTES # BLD AUTO: 3.13 K/UL — SIGNIFICANT CHANGE UP (ref 1.2–3.4)
LYMPHOCYTES # BLD AUTO: 40.7 % — SIGNIFICANT CHANGE UP (ref 20.5–51.1)
MCHC RBC-ENTMCNC: 31.3 PG — HIGH (ref 27–31)
MCHC RBC-ENTMCNC: 33.6 G/DL — SIGNIFICANT CHANGE UP (ref 32–37)
MCV RBC AUTO: 93 FL — SIGNIFICANT CHANGE UP (ref 81–99)
MONOCYTES # BLD AUTO: 0.72 K/UL — HIGH (ref 0.1–0.6)
MONOCYTES NFR BLD AUTO: 9.4 % — HIGH (ref 1.7–9.3)
NEUTROPHILS # BLD AUTO: 3.64 K/UL — SIGNIFICANT CHANGE UP (ref 1.4–6.5)
NEUTROPHILS NFR BLD AUTO: 47.3 % — SIGNIFICANT CHANGE UP (ref 42.2–75.2)
NITRITE UR-MCNC: POSITIVE
NRBC # BLD: 0 /100 WBCS — SIGNIFICANT CHANGE UP (ref 0–0)
PH UR: 5.5 — SIGNIFICANT CHANGE UP (ref 5–8)
PLATELET # BLD AUTO: 300 K/UL — SIGNIFICANT CHANGE UP (ref 130–400)
PMV BLD: 11 FL — HIGH (ref 7.4–10.4)
POTASSIUM SERPL-MCNC: 3.8 MMOL/L — SIGNIFICANT CHANGE UP (ref 3.5–5)
POTASSIUM SERPL-SCNC: 3.8 MMOL/L — SIGNIFICANT CHANGE UP (ref 3.5–5)
PROT SERPL-MCNC: 7.5 G/DL — SIGNIFICANT CHANGE UP (ref 6–8)
PROT UR-MCNC: 30 MG/DL
PROTHROM AB SERPL-ACNC: 11.6 SEC — SIGNIFICANT CHANGE UP (ref 9.95–12.87)
RBC # BLD: 4 M/UL — LOW (ref 4.2–5.4)
RBC # FLD: 12.5 % — SIGNIFICANT CHANGE UP (ref 11.5–14.5)
RBC CASTS # UR COMP ASSIST: 10 /HPF — HIGH (ref 0–4)
SODIUM SERPL-SCNC: 135 MMOL/L — SIGNIFICANT CHANGE UP (ref 135–146)
SP GR SPEC: 1.03 — SIGNIFICANT CHANGE UP (ref 1–1.03)
SQUAMOUS # UR AUTO: 10 /HPF — HIGH (ref 0–5)
TROPONIN T, HIGH SENSITIVITY RESULT: <6 NG/L — SIGNIFICANT CHANGE UP (ref 6–13)
UROBILINOGEN FLD QL: 4 MG/DL (ref 0.2–1)
WBC # BLD: 7.69 K/UL — SIGNIFICANT CHANGE UP (ref 4.8–10.8)
WBC # FLD AUTO: 7.69 K/UL — SIGNIFICANT CHANGE UP (ref 4.8–10.8)
WBC UR QL: 5 /HPF — SIGNIFICANT CHANGE UP (ref 0–5)

## 2024-02-11 PROCEDURE — 99285 EMERGENCY DEPT VISIT HI MDM: CPT

## 2024-02-11 PROCEDURE — 76705 ECHO EXAM OF ABDOMEN: CPT | Mod: 26

## 2024-02-11 PROCEDURE — S2900: CPT

## 2024-02-11 PROCEDURE — 80048 BASIC METABOLIC PNL TOTAL CA: CPT

## 2024-02-11 PROCEDURE — C1769: CPT

## 2024-02-11 PROCEDURE — 88342 IMHCHEM/IMCYTCHM 1ST ANTB: CPT

## 2024-02-11 PROCEDURE — 88312 SPECIAL STAINS GROUP 1: CPT

## 2024-02-11 PROCEDURE — 93010 ELECTROCARDIOGRAM REPORT: CPT

## 2024-02-11 PROCEDURE — 83735 ASSAY OF MAGNESIUM: CPT

## 2024-02-11 PROCEDURE — C1889: CPT

## 2024-02-11 PROCEDURE — C9399: CPT

## 2024-02-11 PROCEDURE — 88304 TISSUE EXAM BY PATHOLOGIST: CPT

## 2024-02-11 PROCEDURE — 36415 COLL VENOUS BLD VENIPUNCTURE: CPT

## 2024-02-11 PROCEDURE — 84100 ASSAY OF PHOSPHORUS: CPT

## 2024-02-11 PROCEDURE — 71045 X-RAY EXAM CHEST 1 VIEW: CPT | Mod: 26

## 2024-02-11 PROCEDURE — C9113: CPT

## 2024-02-11 PROCEDURE — 85025 COMPLETE CBC W/AUTO DIFF WBC: CPT

## 2024-02-11 PROCEDURE — 80076 HEPATIC FUNCTION PANEL: CPT

## 2024-02-11 PROCEDURE — 74018 RADEX ABDOMEN 1 VIEW: CPT

## 2024-02-11 PROCEDURE — 88305 TISSUE EXAM BY PATHOLOGIST: CPT

## 2024-02-11 RX ORDER — SODIUM CHLORIDE 9 MG/ML
1000 INJECTION, SOLUTION INTRAVENOUS
Refills: 0 | Status: DISCONTINUED | OUTPATIENT
Start: 2024-02-11 | End: 2024-02-13

## 2024-02-11 RX ORDER — ACETAMINOPHEN 500 MG
650 TABLET ORAL EVERY 6 HOURS
Refills: 0 | Status: DISCONTINUED | OUTPATIENT
Start: 2024-02-11 | End: 2024-02-13

## 2024-02-11 RX ORDER — ACETAMINOPHEN 500 MG
650 TABLET ORAL ONCE
Refills: 0 | Status: COMPLETED | OUTPATIENT
Start: 2024-02-11 | End: 2024-02-11

## 2024-02-11 RX ORDER — ENOXAPARIN SODIUM 100 MG/ML
40 INJECTION SUBCUTANEOUS EVERY 24 HOURS
Refills: 0 | Status: DISCONTINUED | OUTPATIENT
Start: 2024-02-11 | End: 2024-02-13

## 2024-02-11 RX ORDER — SODIUM CHLORIDE 9 MG/ML
1000 INJECTION, SOLUTION INTRAVENOUS ONCE
Refills: 0 | Status: COMPLETED | OUTPATIENT
Start: 2024-02-11 | End: 2024-02-11

## 2024-02-11 RX ORDER — AMPICILLIN SODIUM AND SULBACTAM SODIUM 250; 125 MG/ML; MG/ML
INJECTION, POWDER, FOR SUSPENSION INTRAMUSCULAR; INTRAVENOUS
Refills: 0 | Status: DISCONTINUED | OUTPATIENT
Start: 2024-02-12 | End: 2024-02-13

## 2024-02-11 RX ORDER — AMPICILLIN SODIUM AND SULBACTAM SODIUM 250; 125 MG/ML; MG/ML
3 INJECTION, POWDER, FOR SUSPENSION INTRAMUSCULAR; INTRAVENOUS ONCE
Refills: 0 | Status: COMPLETED | OUTPATIENT
Start: 2024-02-11 | End: 2024-02-12

## 2024-02-11 RX ORDER — KETOROLAC TROMETHAMINE 30 MG/ML
15 SYRINGE (ML) INJECTION ONCE
Refills: 0 | Status: DISCONTINUED | OUTPATIENT
Start: 2024-02-11 | End: 2024-02-11

## 2024-02-11 RX ORDER — KETOROLAC TROMETHAMINE 30 MG/ML
15 SYRINGE (ML) INJECTION EVERY 8 HOURS
Refills: 0 | Status: DISCONTINUED | OUTPATIENT
Start: 2024-02-11 | End: 2024-02-13

## 2024-02-11 RX ORDER — AMPICILLIN SODIUM AND SULBACTAM SODIUM 250; 125 MG/ML; MG/ML
3 INJECTION, POWDER, FOR SUSPENSION INTRAMUSCULAR; INTRAVENOUS EVERY 6 HOURS
Refills: 0 | Status: DISCONTINUED | OUTPATIENT
Start: 2024-02-12 | End: 2024-02-13

## 2024-02-11 RX ADMIN — Medication 650 MILLIGRAM(S): at 19:55

## 2024-02-11 RX ADMIN — Medication 15 MILLIGRAM(S): at 19:54

## 2024-02-11 RX ADMIN — SODIUM CHLORIDE 1000 MILLILITER(S): 9 INJECTION, SOLUTION INTRAVENOUS at 19:55

## 2024-02-11 RX ADMIN — SODIUM CHLORIDE 110 MILLILITER(S): 9 INJECTION, SOLUTION INTRAVENOUS at 23:11

## 2024-02-11 RX ADMIN — Medication 650 MILLIGRAM(S): at 23:11

## 2024-02-11 NOTE — ED PROVIDER NOTE - OBJECTIVE STATEMENT
38 Y female with no PMH, recently diagnosed with cholelithiasis 6 days ago at [facility], presents ED for evaluation of continued abdominal pain x 1 week.  States her pain is epigastric to right upper quadrant, sharp, constant but waxing and waning, nonradiating, worse after eating but denies other modifying or relieving factors.  + Nausea and vomiting initially but states this is since resolved.  Denies fever, chills, CP, SOB, diarrhea, black or bloody stool, urinary symptoms.  Denies vaginal bleeding or discharge, LMP was 2 weeks ago, sexually active and monogamous relationship and does not use condoms but is not concerned for STDs at this time and does not want testing.

## 2024-02-11 NOTE — ED PROVIDER NOTE - ATTENDING CONTRIBUTION TO CARE
38 yr old f w/ no pmh who presents with abd pain. Pt states that for the past couple of days she has been having epgiastric pain, radiating to the back and has been having decreased PO. Of note, pt states that she went to [facility] x 2 and was told that she had gallstones, pt was given surgery follow up on thursday. However, pt states that she has not been tolerating PO due to n and NBNB vomiting. Pt denies any other medical complaints.     VITAL SIGNS: I have reviewed nursing notes and confirm.  CONSTITUTIONAL: non-toxic, well appearing  SKIN: no rash, no petechiae.  EYES: EOMI, pink conjunctiva, anicteric  ENT: tongue midline, no exudates, MMM  NECK: Supple; no meningismus, no JVD  CARD: RRR, no murmurs, equal radial pulses bilaterally 2+  RESP: CTAB, no respiratory distress  ABD: Soft, tenderness in the epigastric area, non-distended, no peritoneal signs, no HSM, no CVA tenderness    38 yr old f that presents with abd pain, concern for choleycysitits. labs, imaging, pain management. ua, reassess. dispo pending.

## 2024-02-11 NOTE — H&P ADULT - ATTENDING COMMENTS
Multiple ED visits to [facility] for RUQ pain and dx with gallstones and sent home. Now with recurrent RUQ pain. Workup concerning for choledocho with Tbili 3.6 and LFTs elevated. US with no wall thickening and normal CBD.   On exam she is min/mod ttp in RUQ.   Imagining/Labs as above and reviewed  Plan to admit, abx, IVF, GI consult.   If Tbili trends up then prefer ERCP prior to surgery, if Tbili trends down then will proceed with lap bam and IOC first (all depending on OR and GI availability as well).

## 2024-02-11 NOTE — ED PROVIDER NOTE - PHYSICAL EXAMINATION
VITAL SIGNS: I have reviewed nursing notes and confirm.  CONSTITUTIONAL: Well-developed; well-nourished; in no acute distress.  SKIN: Skin exam is warm and dry, no acute rash.  EYES: PERRL, conjunctiva and sclera clear, no icterus  ENT: mmm  CARD: S1, S2 normal; no murmurs, gallops, or rubs. Regular rate and rhythm.  RESP: Normal respiratory effort, no tachypnea or distress. Lungs CTAB, no wheezes, rales or rhonchi.  ABD: soft, ND, (+)TTP to RUQ and epigastrium, (-)stacy's sign, no CVAT  EXT: Normal ROM.    NEURO: Alert, oriented. Grossly unremarkable. No focal deficits.  PSYCH: Cooperative, appropriate.

## 2024-02-11 NOTE — ED PROVIDER NOTE - PROGRESS NOTE DETAILS
THAO: Labs show elevated bilirubin, LFTs.  Ultrasound read pending however concerning for cholecystitis due to gallbladder wall thickening, large size, sludge, large CBD.  I discussed with surgical team in ED regarding patient's exam, history, lab findings.  They do not want antibiotics at this time, plan for admission to surgical team who will follow final read on right upper quadrant ultrasound, patient made aware of plan. THAO: Labs show elevated bilirubin, LFTs.  Ultrasound read pending however concerning for cholecystitis due to gallbladder wall thickening, large size, sludge, large CBD.  I discussed with surgical team in ED regarding patient's exam, history, lab findings.  They do not want antibiotics at this time, plan for admission to surgical team who will follow final read on right upper quadrant ultrasound, patient made aware of plan. GI c/s placed, surgery will follow

## 2024-02-11 NOTE — H&P ADULT - NSHPLABSRESULTS_GEN_ALL_CORE
Labs:  CAPILLARY BLOOD GLUCOSE                              12.5   7.69  )-----------( 300      ( 2024 19:40 )             37.2       Auto Neutrophil %: 47.3 % (24 @ 19:40)  Auto Immature Granulocyte %: 0.3 % (24 @ 19:40)        135  |  100  |  9<L>  ----------------------------<  90  3.8   |  25  |  0.8      Calcium: 8.7 mg/dL (24 @ 19:40)      LFTs:             7.5  | 3.6  | 122      ------------------[141     ( 2024 19:40 )  4.3  | 2.8  | 281         Lipase:29     Amylase:x             Coags:     11.60  ----< 1.02    ( 2024 19:40 )     38.0            Urinalysis Basic - ( 2024 19:40 )    Color: Dark Yellow / Appearance: Cloudy / S.027 / pH: x  Gluc: 90 mg/dL / Ketone: 80 mg/dL  / Bili: Moderate / Urobili: 4.0 mg/dL   Blood: x / Protein: 30 mg/dL / Nitrite: Positive   Leuk Esterase: Small / RBC: 10 /HPF / WBC 5 /HPF   Sq Epi: x / Non Sq Epi: 10 /HPF / Bacteria: Negative /HPF      < from: US Abdomen Upper Quadrant Right (24 @ 21:14) >    No sonographic evidence of acute pathology    Cholelithiasis without sonographic evidence of cholecystitis    Common bile duct measures 6 mm, upper limits of normal    < end of copied text >

## 2024-02-11 NOTE — H&P ADULT - ASSESSMENT
Patient is a 39 yo female with no significant PMH who presents to the ED complaining of abdominal pain for weeks. The patient reports that she went to [facility] and was diagnosed with cholelithiasis 6 days ago. Clinical presentation, radiographic findings and elevated LFT's concerning for possible choledocholithiasis.       Plan:  -Admit to Dr. Pollack surgical service  -NPO, IVF  -Advanced GI consult for ERCP evaluation  -Pain control  -Nausea control  -Trend LFTs

## 2024-02-11 NOTE — H&P ADULT - NSHPPHYSICALEXAM_GEN_ALL_CORE
PHYSICAL EXAM:  GENERAL: NAD, well-appearing  CHEST/LUNG: Clear to auscultation bilaterally  HEART: Regular rate and rhythm  ABDOMEN: Soft, tender to palpation in the RUQ and midepigastrium, Nondistended; , non peritonitic, no rebound tenderness no guarding.   EXTREMITIES:  No clubbing, cyanosis, or edema

## 2024-02-11 NOTE — H&P ADULT - HISTORY OF PRESENT ILLNESS
Patient is a 37 yo female with no significant PMH who presents to the ED complaining of abdominal pain for weeks. The patient reports that she went to [facility] and was diagnosed with cholelithiasis 6 days ago. The patient reports that the abdominal pain that she experiences is located in the RUQ as well as the midepigastric area. The patient endorses nausea and vomiting x3 bilious in content, no fever, no chills. The patient reports that this has never happened to her before like this. The patient reports that she had passed a BM over 5 days ago, last flatus today. The patient reports that her urine is normal colored.

## 2024-02-12 ENCOUNTER — TRANSCRIPTION ENCOUNTER (OUTPATIENT)
Age: 39
End: 2024-02-12

## 2024-02-12 ENCOUNTER — RESULT REVIEW (OUTPATIENT)
Age: 39
End: 2024-02-12

## 2024-02-12 LAB
ALBUMIN SERPL ELPH-MCNC: 3.5 G/DL — SIGNIFICANT CHANGE UP (ref 3.5–5.2)
ALBUMIN SERPL ELPH-MCNC: 3.8 G/DL — SIGNIFICANT CHANGE UP (ref 3.5–5.2)
ALP SERPL-CCNC: 124 U/L — HIGH (ref 30–115)
ALP SERPL-CCNC: 127 U/L — HIGH (ref 30–115)
ALT FLD-CCNC: 196 U/L — HIGH (ref 0–41)
ALT FLD-CCNC: 237 U/L — HIGH (ref 0–41)
ANION GAP SERPL CALC-SCNC: 10 MMOL/L — SIGNIFICANT CHANGE UP (ref 7–14)
AST SERPL-CCNC: 111 U/L — HIGH (ref 0–41)
AST SERPL-CCNC: 77 U/L — HIGH (ref 0–41)
BASOPHILS # BLD AUTO: 0.01 K/UL — SIGNIFICANT CHANGE UP (ref 0–0.2)
BASOPHILS NFR BLD AUTO: 0.2 % — SIGNIFICANT CHANGE UP (ref 0–1)
BILIRUB DIRECT SERPL-MCNC: 0.8 MG/DL — HIGH (ref 0–0.3)
BILIRUB DIRECT SERPL-MCNC: 3.5 MG/DL — HIGH (ref 0–0.3)
BILIRUB INDIRECT FLD-MCNC: 0.5 MG/DL — SIGNIFICANT CHANGE UP (ref 0.2–1.2)
BILIRUB INDIRECT FLD-MCNC: 0.7 MG/DL — SIGNIFICANT CHANGE UP (ref 0.2–1.2)
BILIRUB SERPL-MCNC: 1.3 MG/DL — HIGH (ref 0.2–1.2)
BILIRUB SERPL-MCNC: 4.2 MG/DL — HIGH (ref 0.2–1.2)
BUN SERPL-MCNC: 11 MG/DL — SIGNIFICANT CHANGE UP (ref 10–20)
CALCIUM SERPL-MCNC: 8.1 MG/DL — LOW (ref 8.4–10.5)
CHLORIDE SERPL-SCNC: 103 MMOL/L — SIGNIFICANT CHANGE UP (ref 98–110)
CO2 SERPL-SCNC: 22 MMOL/L — SIGNIFICANT CHANGE UP (ref 17–32)
CREAT SERPL-MCNC: 0.7 MG/DL — SIGNIFICANT CHANGE UP (ref 0.7–1.5)
EGFR: 115 ML/MIN/1.73M2 — SIGNIFICANT CHANGE UP
EOSINOPHIL # BLD AUTO: 0 K/UL — SIGNIFICANT CHANGE UP (ref 0–0.7)
EOSINOPHIL NFR BLD AUTO: 0 % — SIGNIFICANT CHANGE UP (ref 0–8)
GLUCOSE SERPL-MCNC: 206 MG/DL — HIGH (ref 70–99)
HCT VFR BLD CALC: 32.9 % — LOW (ref 37–47)
HGB BLD-MCNC: 11 G/DL — LOW (ref 12–16)
IMM GRANULOCYTES NFR BLD AUTO: 0.3 % — SIGNIFICANT CHANGE UP (ref 0.1–0.3)
LYMPHOCYTES # BLD AUTO: 1.58 K/UL — SIGNIFICANT CHANGE UP (ref 1.2–3.4)
LYMPHOCYTES # BLD AUTO: 24.2 % — SIGNIFICANT CHANGE UP (ref 20.5–51.1)
MAGNESIUM SERPL-MCNC: 1.8 MG/DL — SIGNIFICANT CHANGE UP (ref 1.8–2.4)
MAGNESIUM SERPL-MCNC: 1.9 MG/DL — SIGNIFICANT CHANGE UP (ref 1.8–2.4)
MCHC RBC-ENTMCNC: 31.3 PG — HIGH (ref 27–31)
MCHC RBC-ENTMCNC: 33.4 G/DL — SIGNIFICANT CHANGE UP (ref 32–37)
MCV RBC AUTO: 93.5 FL — SIGNIFICANT CHANGE UP (ref 81–99)
MONOCYTES # BLD AUTO: 0.33 K/UL — SIGNIFICANT CHANGE UP (ref 0.1–0.6)
MONOCYTES NFR BLD AUTO: 5.1 % — SIGNIFICANT CHANGE UP (ref 1.7–9.3)
NEUTROPHILS # BLD AUTO: 4.59 K/UL — SIGNIFICANT CHANGE UP (ref 1.4–6.5)
NEUTROPHILS NFR BLD AUTO: 70.2 % — SIGNIFICANT CHANGE UP (ref 42.2–75.2)
NRBC # BLD: 0 /100 WBCS — SIGNIFICANT CHANGE UP (ref 0–0)
PHOSPHATE SERPL-MCNC: 2.9 MG/DL — SIGNIFICANT CHANGE UP (ref 2.1–4.9)
PHOSPHATE SERPL-MCNC: 3.3 MG/DL — SIGNIFICANT CHANGE UP (ref 2.1–4.9)
PLATELET # BLD AUTO: 263 K/UL — SIGNIFICANT CHANGE UP (ref 130–400)
PMV BLD: 10.5 FL — HIGH (ref 7.4–10.4)
POTASSIUM SERPL-MCNC: 4.2 MMOL/L — SIGNIFICANT CHANGE UP (ref 3.5–5)
POTASSIUM SERPL-SCNC: 4.2 MMOL/L — SIGNIFICANT CHANGE UP (ref 3.5–5)
PROT SERPL-MCNC: 6.1 G/DL — SIGNIFICANT CHANGE UP (ref 6–8)
PROT SERPL-MCNC: 6.5 G/DL — SIGNIFICANT CHANGE UP (ref 6–8)
RBC # BLD: 3.52 M/UL — LOW (ref 4.2–5.4)
RBC # FLD: 12.5 % — SIGNIFICANT CHANGE UP (ref 11.5–14.5)
SODIUM SERPL-SCNC: 135 MMOL/L — SIGNIFICANT CHANGE UP (ref 135–146)
WBC # BLD: 6.53 K/UL — SIGNIFICANT CHANGE UP (ref 4.8–10.8)
WBC # FLD AUTO: 6.53 K/UL — SIGNIFICANT CHANGE UP (ref 4.8–10.8)

## 2024-02-12 PROCEDURE — 43264 ERCP REMOVE DUCT CALCULI: CPT

## 2024-02-12 PROCEDURE — 99232 SBSQ HOSP IP/OBS MODERATE 35: CPT | Mod: 25,57

## 2024-02-12 PROCEDURE — 43261 ENDO CHOLANGIOPANCREATOGRAPH: CPT | Mod: XU

## 2024-02-12 PROCEDURE — 88312 SPECIAL STAINS GROUP 1: CPT | Mod: 26

## 2024-02-12 PROCEDURE — 74328 X-RAY BILE DUCT ENDOSCOPY: CPT | Mod: 26

## 2024-02-12 PROCEDURE — 99223 1ST HOSP IP/OBS HIGH 75: CPT | Mod: 25

## 2024-02-12 PROCEDURE — 88342 IMHCHEM/IMCYTCHM 1ST ANTB: CPT | Mod: 26

## 2024-02-12 PROCEDURE — 88305 TISSUE EXAM BY PATHOLOGIST: CPT | Mod: 26

## 2024-02-12 PROCEDURE — 43262 ENDO CHOLANGIOPANCREATOGRAPH: CPT | Mod: XU

## 2024-02-12 RX ORDER — SODIUM CHLORIDE 9 MG/ML
1000 INJECTION, SOLUTION INTRAVENOUS
Refills: 0 | Status: DISCONTINUED | OUTPATIENT
Start: 2024-02-12 | End: 2024-02-13

## 2024-02-12 RX ORDER — PANTOPRAZOLE SODIUM 20 MG/1
40 TABLET, DELAYED RELEASE ORAL
Refills: 0 | Status: DISCONTINUED | OUTPATIENT
Start: 2024-02-12 | End: 2024-02-13

## 2024-02-12 RX ORDER — PANTOPRAZOLE SODIUM 20 MG/1
40 TABLET, DELAYED RELEASE ORAL
Refills: 0 | Status: DISCONTINUED | OUTPATIENT
Start: 2024-02-12 | End: 2024-02-12

## 2024-02-12 RX ORDER — POTASSIUM CHLORIDE 20 MEQ
20 PACKET (EA) ORAL ONCE
Refills: 0 | Status: COMPLETED | OUTPATIENT
Start: 2024-02-12 | End: 2024-02-12

## 2024-02-12 RX ORDER — INDOMETHACIN 50 MG
100 CAPSULE ORAL ONCE
Refills: 0 | Status: DISCONTINUED | OUTPATIENT
Start: 2024-02-12 | End: 2024-02-12

## 2024-02-12 RX ADMIN — AMPICILLIN SODIUM AND SULBACTAM SODIUM 200 GRAM(S): 250; 125 INJECTION, POWDER, FOR SUSPENSION INTRAMUSCULAR; INTRAVENOUS at 17:52

## 2024-02-12 RX ADMIN — Medication 50 MILLIEQUIVALENT(S): at 03:34

## 2024-02-12 RX ADMIN — AMPICILLIN SODIUM AND SULBACTAM SODIUM 200 GRAM(S): 250; 125 INJECTION, POWDER, FOR SUSPENSION INTRAMUSCULAR; INTRAVENOUS at 06:00

## 2024-02-12 RX ADMIN — PANTOPRAZOLE SODIUM 40 MILLIGRAM(S): 20 TABLET, DELAYED RELEASE ORAL at 17:52

## 2024-02-12 RX ADMIN — Medication 650 MILLIGRAM(S): at 17:52

## 2024-02-12 RX ADMIN — Medication 650 MILLIGRAM(S): at 06:01

## 2024-02-12 RX ADMIN — AMPICILLIN SODIUM AND SULBACTAM SODIUM 200 GRAM(S): 250; 125 INJECTION, POWDER, FOR SUSPENSION INTRAMUSCULAR; INTRAVENOUS at 00:38

## 2024-02-12 NOTE — CONSULT NOTE ADULT - ASSESSMENT
Patient is a 37 y/o female with no significant PMHx who presents to the ED complaining of abdominal pain for weeks. The patient reports that she went to [facility] and was diagnosed with cholelithiasis 6 days ago. The patient reports that the abdominal pain that she experiences is located in the RUQ as well as the midepigastric area. The patient endorses nausea and vomiting x3 bilious in content, no fever, no chills. The patient reports that this has never happened to her before like this. She notes after discharge from [facility] first time pain returned so presented again and was subsequently discharged now with constant pain. She has elevated LFT and ongoing pains since admission.     Patient has a CBD of 6mm, Intact Gallbladder  T kimo over 4    Abdominal Pain/ Biliary colic/ Intermediate Probability of CBD stone  - NPO for now  - On Unasyn  - Lipase wnl  - Plan ERCP today- discussed risk of Pancreatitis and GI bleed   - CCY after- Surgical Team care greatly appreciated- will follow

## 2024-02-12 NOTE — CONSULT NOTE ADULT - SUBJECTIVE AND OBJECTIVE BOX
Patient is a 37 y/o female with no significant PMHx who presents to the ED complaining of abdominal pain for weeks. The patient reports that she went to [facility] and was diagnosed with cholelithiasis 6 days ago. The patient reports that the abdominal pain that she experiences is located in the RUQ as well as the midepigastric area. The patient endorses nausea and vomiting x3 bilious in content, no fever, no chills. The patient reports that this has never happened to her before like this. She notes after discharge from [facility] first time pain returned so presented again and was subsequently discharged now with constant pain. She has elevated LFT and ongoing pains since admission.       PAST MEDICAL & SURGICAL HISTORY:  S/P dilatation and curettage x2      MEDICATIONS  (STANDING):  acetaminophen     Tablet .. 650 milliGRAM(s) Oral every 6 hours  ampicillin/sulbactam  IVPB 3 Gram(s) IV Intermittent every 6 hours  ampicillin/sulbactam  IVPB      enoxaparin Injectable 40 milliGRAM(s) SubCutaneous every 24 hours  lactated ringers. 1000 milliLiter(s) (110 mL/Hr) IV Continuous <Continuous>    MEDICATIONS  (PRN):  ketorolac   Injectable 15 milliGRAM(s) IV Push every 8 hours PRN Severe Pain (7 - 10)      Allergies  No Known Allergies        Review of Systems  General:  Denies Fatigue, Denies Fever, Denies Weakness ,Denies Weight Loss   HEENT: Denies Trouble Swallowing ,Denies  Sore Throat , Denies Change in hearing/vision/speech ,Denies Dizziness    Cardio: Denies  Chest Pain , Palpitations    Respiratory: Denies worsening of SOB, Denies Cough  Abdomen: See detailed HPI  Neuro: Denies Headache Denies Dizziness, Denies Paresthesias  MSK: Denies pain in Bones/Joints/Muscles   Psych: Patient denies depression, denies suicidal or homicidal ideations  Integ: Patient Denies rash, or new skin lesions     Vital Signs Last 24 Hrs  T(C): 36.3 (2024 01:34), Max: 36.3 (2024 18:56)  T(F): 97.4 (2024 01:34), Max: 97.4 (2024 01:34)  HR: 68 (2024 01:34) (68 - 72)  BP: 101/62 (2024 01:34) (101/62 - 130/60)  BP(mean): --  RR: 18 (2024 01:34) (17 - 18)  SpO2: 99% (2024 01:34) (99% - 99%)    Parameters below as of 2024 18:56  Patient On (Oxygen Delivery Method): room air    Physical Exam  Gen: NAD  Head: NC/AT, no visible deformity  ENT: PERRLA, Sclera Icteric   Cardio: S1/S2 No S3/S4, Regular  Resp: CTA B/L  Abdomen: Soft, ND/ TTP  Neuro: AAOx3, Cranial Nerve II-XII intact   Extremities: FROM x 4  Skin: No jaundice, no excoriation     Labs:                          12.5   7.69  )-----------( 300      ( 2024 19:40 )             37.2       Auto Neutrophil %: 47.3 % (24 @ 19:40)  Auto Immature Granulocyte %: 0.3 % (24 @ 19:40)        135  |  100  |  9<L>  ----------------------------<  90  3.8   |  25  |  0.8      Magnesium: 1.9 mg/dL (24 @ 06:38)      LFTs:             6.5  | 4.2  | 111      ------------------[127     ( 2024 06:38 )  3.8  | 3.5  | 237           Coags:     11.60  ----< 1.02    ( 2024 19:40 )     38.0        Urinalysis Basic - ( 2024 19:40 )  Color: Dark Yellow / Appearance: Cloudy / S.027 / pH: x  Gluc: 90 mg/dL / Ketone: 80 mg/dL  / Bili: Moderate / Urobili: 4.0 mg/dL   Blood: x / Protein: 30 mg/dL / Nitrite: Positive   Leuk Esterase: Small / RBC: 10 /HPF / WBC 5 /HPF   Sq Epi: x / Non Sq Epi: 10 /HPF / Bacteria: Negative /HPF      RADIOLOGY & ADDITIONAL STUDIES:  US Abdomen Upper Quadrant Right 24   IMPRESSION:  No sonographic evidence of acute pathology    Cholelithiasis without sonographic evidence of cholecystitis    Common bile duct measures 6 mm, upper limits of normal
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
## 2024-02-12 NOTE — PROGRESS NOTE ADULT - ATTENDING COMMENTS
Gi planning ERCP today for elevated LFTs and concern for choledocho.   Will then perform lap bam prior to discharge, booked for tomorrow.   Pt is comfortable at this time and denies pain.

## 2024-02-13 ENCOUNTER — TRANSCRIPTION ENCOUNTER (OUTPATIENT)
Age: 39
End: 2024-02-13

## 2024-02-13 ENCOUNTER — RESULT REVIEW (OUTPATIENT)
Age: 39
End: 2024-02-13

## 2024-02-13 PROCEDURE — 88304 TISSUE EXAM BY PATHOLOGIST: CPT | Mod: 26

## 2024-02-13 PROCEDURE — S2900 ROBOTIC SURGICAL SYSTEM: CPT | Mod: NC

## 2024-02-13 PROCEDURE — 99233 SBSQ HOSP IP/OBS HIGH 50: CPT

## 2024-02-13 PROCEDURE — 47562 LAPAROSCOPIC CHOLECYSTECTOMY: CPT

## 2024-02-13 RX ORDER — ENOXAPARIN SODIUM 100 MG/ML
40 INJECTION SUBCUTANEOUS EVERY 24 HOURS
Refills: 0 | Status: DISCONTINUED | OUTPATIENT
Start: 2024-02-13 | End: 2024-02-14

## 2024-02-13 RX ORDER — PANTOPRAZOLE SODIUM 20 MG/1
40 TABLET, DELAYED RELEASE ORAL
Refills: 0 | Status: DISCONTINUED | OUTPATIENT
Start: 2024-02-13 | End: 2024-02-13

## 2024-02-13 RX ORDER — SODIUM CHLORIDE 9 MG/ML
1000 INJECTION, SOLUTION INTRAVENOUS
Refills: 0 | Status: DISCONTINUED | OUTPATIENT
Start: 2024-02-13 | End: 2024-02-13

## 2024-02-13 RX ORDER — ONDANSETRON 8 MG/1
4 TABLET, FILM COATED ORAL ONCE
Refills: 0 | Status: DISCONTINUED | OUTPATIENT
Start: 2024-02-13 | End: 2024-02-13

## 2024-02-13 RX ORDER — KETOROLAC TROMETHAMINE 30 MG/ML
15 SYRINGE (ML) INJECTION EVERY 8 HOURS
Refills: 0 | Status: DISCONTINUED | OUTPATIENT
Start: 2024-02-13 | End: 2024-02-14

## 2024-02-13 RX ORDER — HYDROMORPHONE HYDROCHLORIDE 2 MG/ML
0.5 INJECTION INTRAMUSCULAR; INTRAVENOUS; SUBCUTANEOUS
Refills: 0 | Status: DISCONTINUED | OUTPATIENT
Start: 2024-02-13 | End: 2024-02-13

## 2024-02-13 RX ORDER — HYDROMORPHONE HYDROCHLORIDE 2 MG/ML
1 INJECTION INTRAMUSCULAR; INTRAVENOUS; SUBCUTANEOUS
Refills: 0 | Status: DISCONTINUED | OUTPATIENT
Start: 2024-02-13 | End: 2024-02-13

## 2024-02-13 RX ORDER — ACETAMINOPHEN 500 MG
650 TABLET ORAL EVERY 6 HOURS
Refills: 0 | Status: DISCONTINUED | OUTPATIENT
Start: 2024-02-13 | End: 2024-02-13

## 2024-02-13 RX ORDER — ACETAMINOPHEN 500 MG
650 TABLET ORAL EVERY 6 HOURS
Refills: 0 | Status: DISCONTINUED | OUTPATIENT
Start: 2024-02-13 | End: 2024-02-14

## 2024-02-13 RX ORDER — ENOXAPARIN SODIUM 100 MG/ML
40 INJECTION SUBCUTANEOUS EVERY 24 HOURS
Refills: 0 | Status: DISCONTINUED | OUTPATIENT
Start: 2024-02-13 | End: 2024-02-13

## 2024-02-13 RX ORDER — KETOROLAC TROMETHAMINE 30 MG/ML
15 SYRINGE (ML) INJECTION EVERY 8 HOURS
Refills: 0 | Status: DISCONTINUED | OUTPATIENT
Start: 2024-02-13 | End: 2024-02-13

## 2024-02-13 RX ORDER — PANTOPRAZOLE SODIUM 20 MG/1
40 TABLET, DELAYED RELEASE ORAL
Refills: 0 | Status: DISCONTINUED | OUTPATIENT
Start: 2024-02-13 | End: 2024-02-14

## 2024-02-13 RX ADMIN — ENOXAPARIN SODIUM 40 MILLIGRAM(S): 100 INJECTION SUBCUTANEOUS at 21:40

## 2024-02-13 RX ADMIN — AMPICILLIN SODIUM AND SULBACTAM SODIUM 200 GRAM(S): 250; 125 INJECTION, POWDER, FOR SUSPENSION INTRAMUSCULAR; INTRAVENOUS at 11:20

## 2024-02-13 RX ADMIN — HYDROMORPHONE HYDROCHLORIDE 0.5 MILLIGRAM(S): 2 INJECTION INTRAMUSCULAR; INTRAVENOUS; SUBCUTANEOUS at 18:05

## 2024-02-13 RX ADMIN — Medication 15 MILLIGRAM(S): at 22:08

## 2024-02-13 RX ADMIN — AMPICILLIN SODIUM AND SULBACTAM SODIUM 200 GRAM(S): 250; 125 INJECTION, POWDER, FOR SUSPENSION INTRAMUSCULAR; INTRAVENOUS at 03:24

## 2024-02-13 RX ADMIN — SODIUM CHLORIDE 110 MILLILITER(S): 9 INJECTION, SOLUTION INTRAVENOUS at 03:22

## 2024-02-13 RX ADMIN — ENOXAPARIN SODIUM 40 MILLIGRAM(S): 100 INJECTION SUBCUTANEOUS at 05:15

## 2024-02-13 RX ADMIN — Medication 15 MILLIGRAM(S): at 21:38

## 2024-02-13 RX ADMIN — Medication 650 MILLIGRAM(S): at 02:22

## 2024-02-13 RX ADMIN — PANTOPRAZOLE SODIUM 40 MILLIGRAM(S): 20 TABLET, DELAYED RELEASE ORAL at 18:49

## 2024-02-13 RX ADMIN — SODIUM CHLORIDE 110 MILLILITER(S): 9 INJECTION, SOLUTION INTRAVENOUS at 05:17

## 2024-02-13 RX ADMIN — AMPICILLIN SODIUM AND SULBACTAM SODIUM 200 GRAM(S): 250; 125 INJECTION, POWDER, FOR SUSPENSION INTRAMUSCULAR; INTRAVENOUS at 05:13

## 2024-02-13 RX ADMIN — PANTOPRAZOLE SODIUM 40 MILLIGRAM(S): 20 TABLET, DELAYED RELEASE ORAL at 05:09

## 2024-02-13 RX ADMIN — Medication 650 MILLIGRAM(S): at 18:50

## 2024-02-13 RX ADMIN — Medication 650 MILLIGRAM(S): at 02:52

## 2024-02-13 NOTE — PROGRESS NOTE ADULT - ASSESSMENT
ASSESSMENT:  37 yo female with no significant PMH who presents to the ED complaining of abdominal pain for weeks. The patient reports that she went to [facility] and was diagnosed with cholelithiasis 6 days ago. Clinical presentation, radiographic findings and elevated LFT's concerning for possible choledocholithiasis.       Plan:  - OR today  - NPO, IVF   - Pain control   - Hemodynamic monitoring  - Trend LFTs  - Continue abx  - Encourage ambulation     [phone]
Patient is a 37 y/o female with no significant PMHx who presents to the ED complaining of abdominal pain for weeks. Patient is s/p ERCP done 2/12 with sphincterotomy, removal of four stones, and biopsy from Duodenal Ulcers. She is doing well, tolerated clear liquids and denies pain, nausea, emesis, melena, or any additional overnight events.       Abdominal Pain/ Biliary colic/ S/P ERCP with removal of 4 CBD stones   - On Unasyn  - ERCP done- No stent placed  - Pantoprazole 40mg BID given Non Bleeding duodenal ulcer- Follow up Bx as outpatient   - LFT and abdominal exam reassuring and improved   - Awaiting CCY  - Follow up with Dr. Castaneda in 6 weeks [phone] for appt- Discharge on Pantoprazole 40mg Daily for one month  
ASSESSMENT:  37 yo female with no significant PMH who presents to the ED complaining of abdominal pain for weeks. The patient reports that she went to [facility] and was diagnosed with cholelithiasis 6 days ago. Clinical presentation, radiographic findings and elevated LFT's concerning for possible choledocholithiasis.       Plan:  - NPO, IVF  - F/u GI for ERCP   - Pain control  - Nausea control   - Trend LFTs  - Continue IV abx   - Hemodynamic monitoring   - Encourage ambulation     [phone]

## 2024-02-14 ENCOUNTER — TRANSCRIPTION ENCOUNTER (OUTPATIENT)
Age: 39
End: 2024-02-14

## 2024-02-14 VITALS — DIASTOLIC BLOOD PRESSURE: 56 MMHG | SYSTOLIC BLOOD PRESSURE: 101 MMHG | HEART RATE: 77 BPM

## 2024-02-14 RX ORDER — PANTOPRAZOLE SODIUM 20 MG/1
1 TABLET, DELAYED RELEASE ORAL
Qty: 30 | Refills: 0
Start: 2024-02-14 | End: 2024-03-14

## 2024-02-14 RX ADMIN — Medication 650 MILLIGRAM(S): at 05:13

## 2024-02-14 RX ADMIN — Medication 15 MILLIGRAM(S): at 10:20

## 2024-02-14 RX ADMIN — Medication 650 MILLIGRAM(S): at 05:43

## 2024-02-14 RX ADMIN — Medication 15 MILLIGRAM(S): at 10:01

## 2024-02-14 RX ADMIN — PANTOPRAZOLE SODIUM 40 MILLIGRAM(S): 20 TABLET, DELAYED RELEASE ORAL at 05:13

## 2024-02-14 NOTE — DISCHARGE NOTE PROVIDER - CARE PROVIDERS DIRECT ADDRESSES
[email or web address]

## 2024-02-14 NOTE — PROGRESS NOTE ADULT - SUBJECTIVE AND OBJECTIVE BOX
GENERAL SURGERY PROGRESS NOTE     JOHANNA LARSEN  [address] [place] [address] day :4d    POD:1d  Procedure: Robot-assisted cholecystectomy      Surgical Attending: Gloria Pollack  Overnight events: No acute events overnight. Pt is tolerating her low fat, low fiber diet without any nausea or vomiting.     T(F): 97.7 (02-13-24 @ 20:30), Max: 98 (02-13-24 @ 17:46)  HR: 89 (02-13-24 @ 20:30) (70 - 89)  BP: 90/50 (02-13-24 @ 20:30) (90/50 - 116/62)  ABP: --  ABP(mean): --  RR: 18 (02-13-24 @ 20:30) (12 - 18)  SpO2: 96% (02-13-24 @ 20:30) (96% - 100%)    IN'S / OUT's:      PHYSICAL EXAM:  GENERAL: NAD, well-appearing  CHEST/LUNG: Clear to auscultation bilaterally  HEART: Regular rate and rhythm  ABDOMEN: Soft, Nontender, Nondistended; Incisions closed with dermabond. No evidence of bleeding or drainage  EXTREMITIES:  No clubbing, cyanosis, or edema      LABS  Labs:  CAPILLARY BLOOD GLUCOSE                              11.0   6.53  )-----------( 263      ( 12 Feb 2024 22:41 )             32.9         02-12    135  |  103  |  11  ----------------------------<  206<H>  4.2   |  22  |  0.7          LFTs:             6.1  | 1.3  | 77       ------------------[124     ( 12 Feb 2024 22:41 )  3.5  | 0.8  | 196         Lipase:x      Amylase:x             Coags:            Urinalysis Basic - ( 12 Feb 2024 22:41 )    Color: x / Appearance: x / SG: x / pH: x  Gluc: 206 mg/dL / Ketone: x  / Bili: x / Urobili: x   Blood: x / Protein: x / Nitrite: x   Leuk Esterase: x / RBC: x / WBC x   Sq Epi: x / Non Sq Epi: x / Bacteria: x            RADIOLOGY & ADDITIONAL TESTS:      A/P:  JOHANNA LARSEN is a 37 yo female with no significant PMH who presents to the ED complaining of abdominal pain for weeks. The patient reports that she went to [facility] and was diagnosed with cholelithiasis 6 days ago. Clinical presentation, radiographic findings and elevated LFT's concerning for possible choledocholithiasis. She is now S/P laparoscopic cholecystectomy        PLAN:   - Low fat low fiber diet  - monitor for return of bowel function  - multi modal pain control  - DVT and GI ppx  - encourage activity and IS as tolerated  - Possible discharge 2/14        #DVT ppx: enoxaparin Injectable 40 milliGRAM(s) SubCutaneous every 24 hours    #GI ppx: pantoprazole  Injectable 40 milliGRAM(s) IV Push two times a day    Disposition:  HOME    Above plan discussed with Attending Surgeon Dr. Pollack patient, patient family, and Primary team    [employer] [id number]    
GENERAL SURGERY PROGRESS NOTE    Patient: JOHANNA LARSEN , 38y ([birth year])Female   MRN: [id number]  Location: [address]  Visit: 02-11-24 Inpatient  Date: 02-13-24 @ 08:36    Hospital Day #: 3     Events of past 24 hours: s/p ERCP with [facility]. Started PPI BID. Pre op for OR today     PAST MEDICAL & SURGICAL HISTORY:  No pertinent past medical history      S/P dilatation and curettage  x2          Vitals:   T(F): 97.6 (02-13-24 @ 05:31), Max: 97.7 (02-12-24 @ 19:00)  HR: 77 (02-13-24 @ 07:07)  BP: 107/52 (02-13-24 @ 07:07)  RR: 18 (02-13-24 @ 05:31)  SpO2: 97% (02-13-24 @ 05:31)      Diet, Clear Liquid  Diet, NPO after Midnight:      NPO Start Date: 12-Feb-2024,   NPO Start Time: 23:59      Fluids: lactated ringers.: Solution, 1000 milliLiter(s) infuse at 1000 mL/Hr, Stop After 1 Doses  Provider's Contact #: [phone]      I & O's:        PHYSICAL EXAM:  General: NAD, AAOx3, calm and cooperative  HEENT: NCAT, ADDY, EOMI,   Cardiac: RRR   Respiratory: CTAB, normal respiratory effort,   Abdomen: Soft, non-distended, non-tender  Skin: Warm/dry, normal color, no jaundice    MEDICATIONS  (STANDING):  acetaminophen     Tablet .. 650 milliGRAM(s) Oral every 6 hours  ampicillin/sulbactam  IVPB 3 Gram(s) IV Intermittent every 6 hours  ampicillin/sulbactam  IVPB      enoxaparin Injectable 40 milliGRAM(s) SubCutaneous every 24 hours  lactated ringers. 1000 milliLiter(s) (110 mL/Hr) IV Continuous <Continuous>  lactated ringers. 1000 milliLiter(s) (1000 mL/Hr) IV Continuous <Continuous>  pantoprazole  Injectable 40 milliGRAM(s) IV Push two times a day    MEDICATIONS  (PRN):  ketorolac   Injectable 15 milliGRAM(s) IV Push every 8 hours PRN Severe Pain (7 - 10)      DVT PROPHYLAXIS: enoxaparin Injectable 40 milliGRAM(s) SubCutaneous every 24 hours    GI PROPHYLAXIS: pantoprazole  Injectable 40 milliGRAM(s) IV Push two times a day    ANTICOAGULATION:   ANTIBIOTICS:  ampicillin/sulbactam  IVPB 3 Gram(s)  ampicillin/sulbactam  IVPB              LAB/STUDIES:  Labs:  CAPILLARY BLOOD GLUCOSE                              11.0   6.53  )-----------( 263      ( 12 Feb 2024 22:41 )             32.9       Auto Neutrophil %: 70.2 % (02-12-24 @ 22:41)  Auto Immature Granulocyte %: 0.3 % (02-12-24 @ 22:41)    02-12    135  |  103  |  11  ----------------------------<  206<H>  4.2   |  22  |  0.7      Calcium: 8.1 mg/dL (02-12-24 @ 22:41)      LFTs:             6.1  | 1.3  | 77       ------------------[124     ( 12 Feb 2024 22:41 )  3.5  | 0.8  | 196         Lipase:x      Amylase:x             Coags:     11.60  ----< 1.02    ( 11 Feb 2024 19:40 )     38.0                Urinalysis Basic - ( 12 Feb 2024 22:41 )    Color: x / Appearance: x / SG: x / pH: x  Gluc: 206 mg/dL / Ketone: x  / Bili: x / Urobili: x   Blood: x / Protein: x / Nitrite: x   Leuk Esterase: x / RBC: x / WBC x   Sq Epi: x / Non Sq Epi: x / Bacteria: x                IMAGING:    
GENERAL SURGERY PROGRESS NOTE    Patient: JOHANNA LARSEN , 38y ([birth year])Female   MRN: [id number]  Location: [place] [address] [address]  Visit: 24 Inpatient  Date: 24 @ 07:58    Hospital Day #: 2    Events of past 24 hours: Patient admitted to surgery. Patient was seen and examined at bedside this AM. Abdomen soft, ND, NT.     PAST MEDICAL & SURGICAL HISTORY:  No pertinent past medical history      S/P dilatation and curettage  x2          Vitals:   T(F): 97.4 (24 @ 01:34), Max: 97.4 (24 @ 01:34)  HR: 68 (24 @ 01:34)  BP: 101/62 (24 @ 01:34)  RR: 18 (24 @ 01:34)  SpO2: 99% (24 @ 01:34)      Diet, NPO:   With Ice Chips/Sips of Water      Fluids: lactated ringers.: Solution, 1000 milliLiter(s) infuse at 110 mL/Hr      I & O's:      PHYSICAL EXAM:  General: NAD, AAOx3,   HEENT: NCAT, ADDY, EOMI,  Cardiac: RRR  Respiratory: CTAB, normal respiratory effort  Abdomen: Soft, non-distended, non-tender,  Skin: Warm/dry, normal color, no jaundice    MEDICATIONS  (STANDING):  acetaminophen     Tablet .. 650 milliGRAM(s) Oral every 6 hours  ampicillin/sulbactam  IVPB      ampicillin/sulbactam  IVPB 3 Gram(s) IV Intermittent every 6 hours  enoxaparin Injectable 40 milliGRAM(s) SubCutaneous every 24 hours  lactated ringers. 1000 milliLiter(s) (110 mL/Hr) IV Continuous <Continuous>    MEDICATIONS  (PRN):  ketorolac   Injectable 15 milliGRAM(s) IV Push every 8 hours PRN Severe Pain (7 - 10)      DVT PROPHYLAXIS: enoxaparin Injectable 40 milliGRAM(s) SubCutaneous every 24 hours    GI PROPHYLAXIS:   ANTICOAGULATION:   ANTIBIOTICS:  ampicillin/sulbactam  IVPB 3 Gram(s)  ampicillin/sulbactam  IVPB              LAB/STUDIES:  Labs:  CAPILLARY BLOOD GLUCOSE                              12.5   7.69  )-----------( 300      ( 2024 19:40 )             37.2       Auto Neutrophil %: 47.3 % (24 @ 19:40)  Auto Immature Granulocyte %: 0.3 % (24 @ 19:40)        135  |  100  |  9<L>  ----------------------------<  90  3.8   |  25  |  0.8      Magnesium: 1.9 mg/dL (24 @ 06:38)      LFTs:             6.5  | 4.2  | 111      ------------------[127     ( 2024 06:38 )  3.8  | 3.5  | 237         Lipase:x      Amylase:x             Coags:     11.60  ----< 1.02    ( 2024 19:40 )     38.0                Urinalysis Basic - ( 2024 19:40 )    Color: Dark Yellow / Appearance: Cloudy / S.027 / pH: x  Gluc: 90 mg/dL / Ketone: 80 mg/dL  / Bili: Moderate / Urobili: 4.0 mg/dL   Blood: x / Protein: 30 mg/dL / Nitrite: Positive   Leuk Esterase: Small / RBC: 10 /HPF / WBC 5 /HPF   Sq Epi: x / Non Sq Epi: 10 /HPF / Bacteria: Negative /HPF                IMAGING:  
Patient is a 39 y/o female with no significant PMHx who presents to the ED complaining of abdominal pain for weeks. Patient is s/p ERCP done 2/12 with sphincterotomy, removal of four stones, and biopsy from Duodenal Ulcers. She is doing well, tolerated clear liquids and denies pain, nausea, emesis, melena, or any additional overnight events.       PAST MEDICAL & SURGICAL HISTORY:  S/P dilatation and curettage x2      MEDICATIONS  (STANDING):  acetaminophen     Tablet .. 650 milliGRAM(s) Oral every 6 hours  ampicillin/sulbactam  IVPB 3 Gram(s) IV Intermittent every 6 hours  ampicillin/sulbactam  IVPB      enoxaparin Injectable 40 milliGRAM(s) SubCutaneous every 24 hours  lactated ringers. 1000 milliLiter(s) (110 mL/Hr) IV Continuous <Continuous>    MEDICATIONS  (PRN):  ketorolac   Injectable 15 milliGRAM(s) IV Push every 8 hours PRN Severe Pain (7 - 10)      Allergies  No Known Allergies        Review of Systems  General:  Denies Fatigue, Denies Fever, Denies Weakness ,Denies Weight Loss   HEENT: Denies Trouble Swallowing ,Denies  Sore Throat , Denies Change in hearing/vision/speech ,Denies Dizziness    Cardio: Denies  Chest Pain , Palpitations    Respiratory: Denies worsening of SOB, Denies Cough  Abdomen: See detailed HPI  Neuro: Denies Headache Denies Dizziness, Denies Paresthesias  MSK: Denies pain in Bones/Joints/Muscles   Psych: Patient denies depression, denies suicidal or homicidal ideations  Integ: Patient Denies rash, or new skin lesions       Vital Signs Last 24 Hrs  T(C): 36.4 (13 Feb 2024 05:31), Max: 36.5 (12 Feb 2024 19:00)  T(F): 97.6 (13 Feb 2024 05:31), Max: 97.7 (12 Feb 2024 19:00)  HR: 77 (13 Feb 2024 07:07) (75 - 106)  BP: 107/52 (13 Feb 2024 07:07) (86/51 - 136/73)  BP(mean): 75 (13 Feb 2024 07:07) (75 - 82)  RR: 18 (13 Feb 2024 05:31) (18 - 18)  SpO2: 97% (13 Feb 2024 05:31) (95% - 99%)    Parameters below as of 12 Feb 2024 15:08  Patient On (Oxygen Delivery Method): room air      Physical Exam  Gen: NAD  Head: NC/AT, no visible deformity  ENT: PERRLA, Sclera Icteric   Cardio: S1/S2 No S3/S4, Regular  Resp: CTA B/L  Abdomen: Soft, ND/ NT  Neuro: AAOx3  Extremities: FROM x 4  Skin: No jaundice, no excoriation     Labs:                        11.0   6.53  )-----------( 263      ( 12 Feb 2024 22:41 )             32.9     02-12    135  |  103  |  11  ----------------------------<  206<H>  4.2   |  22  |  0.7    Ca    8.1<L>      12 Feb 2024 22:41  Phos  2.9     02-12  Mg     1.8     02-12    TPro  6.1  /  Alb  3.5  /  TBili  1.3<H>  /  DBili  0.8<H>  /  AST  77<H>  /  ALT  196<H>  /  AlkPhos  124<H>  02-12      RADIOLOGY & ADDITIONAL STUDIES:  US Abdomen Upper Quadrant Right 02.11.24   IMPRESSION:  No sonographic evidence of acute pathology    Cholelithiasis without sonographic evidence of cholecystitis    Common bile duct measures 6 mm, upper limits of normal

## 2024-02-14 NOTE — DISCHARGE NOTE PROVIDER - PROVIDER TOKENS
PROVIDER:[TOKEN:[[id number]],FOLLOWUP:[2 weeks]],PROVIDER:[TOKEN:[[id number]],FOLLOWUP:[1 month]]

## 2024-02-14 NOTE — CHART NOTE - NSCHARTNOTEFT_GEN_A_CORE
ERCP. Multiple duodenal ulcers(biopsy). Successful biliary sphincterotomy with extraction of multiple CBD stones using a biliary stone extraction balloon. Occlusion cholangiogram was unremarkable thereafter. Cystic duct did not fill suggestive cystic duct obstruction.    REC:  Clear liquid diet.  Watch for post- ERCP complications of abdominal pain and bleeding.   Advance diet in am if uneventful unless patient is going for cholecystectomy.  Cholecystectomy per surgery.  Trend LFTs.   PPI BID  Avoid NSAIDs. [facility] to follow.
PACU ANESTHESIA ADMISSION NOTE      Procedure: ERCP  Post op diagnosis:  choledocholithiasis    ____  Intubated  TV:______       Rate: ______      FiO2: ______    __x__  Patent Airway    __x__  Full return of protective reflexes    __x__  Full recovery from anesthesia / back to baseline status    Vitals:  T(C): 36.3 (02-12-24 @ 12:10), Max: 36.3 (02-11-24 @ 18:56)  HR: 81 (02-12-24 @ 12:10) (68 - 93)  BP: 86/51 (02-12-24 @ 12:10) (86/51 - 130/60)  RR: 18 (02-12-24 @ 12:10) (17 - 18)  SpO2: 99% (02-12-24 @ 12:10) (99% - 99%)    Mental Status:  __x__ Awake   ___x__ Alert   _____ Drowsy   _____ Sedated    Nausea/Vomiting:  __x__ NO  ______Yes,   See Post - Op Orders          Pain Scale (0-10):  _____    Treatment: ____ None    __x__ See Post - Op/PCA Orders    Post - Operative Fluids:   ____ Oral   __x__ See Post - Op Orders    Plan: Discharge:   ____Home       __x___Floor     _____Critical Care    _____  Other:_________________    Comments: Patient had smooth intraoperative event, no anesthesia complication.  PACU Vital signs: HR:  100          BP:        136/73          RR:   18          O2 Sat:       98%     Temp 98.6
Post Operative Note  Patient: JOHANNA LARSEN 38y (1985) Female   MRN: [id number]  Location: [address]  Visit: 02-11-24 Inpatient  Date: 02-14-24 @ 00:16    Procedure: S/P Laparoscopic cholecystectomy    Subjective:   Nausea: yes, Vomiting: no, Ambulating: yes , Flatus: no  Pain Assessment: yes no , Location: Tender at incisions , Pain Intensity: 2/10 , Quality:  Sore    Objective:  Vitals: T(F): 97.7 (02-13-24 @ 20:30), Max: 98 (02-13-24 @ 17:46)  HR: 89 (02-13-24 @ 20:30)  BP: 90/50 (02-13-24 @ 20:30) (90/50 - 116/62)  RR: 18 (02-13-24 @ 20:30)  SpO2: 96% (02-13-24 @ 20:30)  Vent Settings:     In:   IV Fluids:     Out:   EBL:   Voided Urine:   [place] Catheter: yes no   Drains:   BHAVIN:  ,   Chest Tube:    NG Tube:     Physical Examination:  General Appearance: NAD  HEENT: EOMI, sclera non-icteric.  Heart: RRR  Lungs: CTABL  Abdomen:  Soft, nontender, nondistended. No rigidity, guarding, or rebound tenderness.   MSK/Extremities: Warm & well-perfused. Peripheral pulses intact.  Skin: Warm, dry. No jaundice.   Incisions/Wounds: Incisions closed with dermabond, clean, dry and intact, no signs of infection/active bleeding/drainage    Medications: [Standing]  acetaminophen     Tablet .. 650 milliGRAM(s) Oral every 6 hours  enoxaparin Injectable 40 milliGRAM(s) SubCutaneous every 24 hours  ketorolac   Injectable 15 milliGRAM(s) IV Push every 8 hours PRN  pantoprazole  Injectable 40 milliGRAM(s) IV Push two times a day    Medications: [PRN]  acetaminophen     Tablet .. 650 milliGRAM(s) Oral every 6 hours  enoxaparin Injectable 40 milliGRAM(s) SubCutaneous every 24 hours  ketorolac   Injectable 15 milliGRAM(s) IV Push every 8 hours PRN  pantoprazole  Injectable 40 milliGRAM(s) IV Push two times a day    Labs:                        11.0   6.53  )-----------( 263      ( 12 Feb 2024 22:41 )             32.9     02-12    135  |  103  |  11  ----------------------------<  206<H>  4.2   |  22  |  0.7    Ca    8.1<L>      12 Feb 2024 22:41  Phos  2.9     02-12  Mg     1.8     02-12    TPro  6.1  /  Alb  3.5  /  TBili  1.3<H>  /  DBili  0.8<H>  /  AST  77<H>  /  ALT  196<H>  /  AlkPhos  124<H>  02-12          Imaging:  No post-op imaging studies    Assessment:  38yFemale patient S/P Laparoscopic cholecystectomy for acute cholecystitis    Plan:  Low fat diet, maintain IVF hydration  Strict I/Os - will continue to monitor UOP  Continue GI regimen - protonix  Continue DVT ppx - lovenox, SCDs, ambulation  Antiemetics/pain control PRN  Encouraged ambulation multiple times daily, patient instructed on IS use 10x/hr      Date/Time: 02-14-24 @ 00:16
PACU ANESTHESIA ADMISSION NOTE      Procedure: Robot-assisted cholecystectomy      Post op diagnosis:  Choledocholithiasis        ____  Intubated  TV:______       Rate: ______      FiO2: ______    _x___  Patent Airway    _x___  Full return of protective reflexes    _x___  Full recovery from anesthesia / back to baseline status    Vitals  SPO2:-99  HR:-93  RR:-11  B.P:-116/62  TEMP:-98    Mental Status:  _x___ Awake   ___x_ Alert   _____ Drowsy   _____ Sedated    Nausea/Vomiting:  _x___  NO       ______Yes,   See Post - Op Orders         Pain Scale (0-10):  __0___    Treatment: _x___ None    __x__ See Post - Op/PCA Orders    Post - Operative Fluids:   ___ Oral   ____x See Post - Op Orders    Plan: Discharge:   ____Home       ___x__Floor     _____Critical Care    _____  Other:_________________    Comments:  Report endorsed to RN in pacu  Vitals stable  No anesthesia issues or complications noted.  Discharge to patient to floor / home when criteria met.

## 2024-02-14 NOTE — DISCHARGE NOTE PROVIDER - NSDCFUADDINST_GEN_ALL_CORE_FT
You are being discharged after laparoscopic cholecystectomy. Your incisions were closed with absorbable sutures and covered with dermabond surgical glue. You may shower, allowing water to flow over the incisions, but please do not scrub at them and do not fully submerge in water for 2 weeks. Please avoid heavy lifting (greater than 10 lbs) for 6 weeks after the operation. Please follow a low fat diet (avoiding greasy, fried foods or large amounts of cheese) for 1-2 weeks. For pain, you may take tylenol or ibuprofen over the counter as directed.     You underwent ERCP with the GI team, and they recommend taking Pantoprazole 40 mg daily for 1 month because they saw ulcers in the duodenum during the procedure. A prescription has been sent to your pharmacy. Please call Dr. Castaneda (Gastroenterology) to schedule a follow up appointment in approximately 6 weeks.     You underwent laparoscopic cholecystectomy with Dr. Pollack, please call her office to schedule a follow up appointment in approximately 1-2 weeks. If you have any questions, experience fever greater than 100.4, have any redness around incisions, please call Dr. Pollack's office.

## 2024-02-14 NOTE — DISCHARGE NOTE NURSING/CASE MANAGEMENT/SOCIAL WORK - NSDCPEFALRISK_GEN_ALL_CORE
For information on Fall & Injury Prevention, visit: [email or web address] OR  [email or web address] OR  https://www.cdc.gov/steadi/patient.html

## 2024-02-14 NOTE — DISCHARGE NOTE NURSING/CASE MANAGEMENT/SOCIAL WORK - PATIENT PORTAL LINK FT
You can access the FollowMyHealth Patient Portal offered by [facility] by registering at the following website: [email or web address]. By joining [facility]’s FollowMyHealth portal, you will also be able to view your health information using other applications (apps) compatible with our system.

## 2024-02-14 NOTE — DISCHARGE NOTE PROVIDER - CARE PROVIDER_API CALL
Gloria Pollack  Surgery  [address] [place] [address]  Phone: [phone]  Fax: [phone]  Follow Up Time: 2 weeks    Flex Castaneda  Gastroenterology  [address] [place] [id number]  Phone: [phone]  Fax: [phone]  Follow Up Time: 1 month

## 2024-02-14 NOTE — DISCHARGE NOTE PROVIDER - HOSPITAL COURSE
Patient is a 39 yo female with no significant PMH who presents to the ED on 2/11 complaining of abdominal pain for weeks. The patient reports that she went to [facility] and was diagnosed with cholelithiasis 6 days ago. The patient reports that the abdominal pain that she experiences is located in the RUQ as well as the midepigastric area. The patient endorses nausea and vomiting x3 bilious in content, no fever, no chills. The patient reports that this has never happened to her before like this. The patient reports that she had passed a BM over 5 days ago, last flatus today. The patient reports that her urine is normal colored.     She was admitted for further workup and management. She underwent ERCP with GI on 2/12 which showed duodenal ulcers from which biopsies were taken, and sphincterotomy with extraction of multiple CBD stones. She tolerated the procedure well and underwent laparoscopic cholecystectomy the following day on 2/13. She has been recovering well, tolerating diet, with pain controlled, and is now medically cleared for discharge.

## 2024-02-15 LAB — SURGICAL PATHOLOGY STUDY: SIGNIFICANT CHANGE UP

## 2024-02-21 DIAGNOSIS — K26.9 DUODENAL ULCER, UNSPECIFIED AS ACUTE OR CHRONIC, WITHOUT HEMORRHAGE OR PERFORATION: ICD-10-CM

## 2024-02-21 DIAGNOSIS — K80.51 CALCULUS OF BILE DUCT WITHOUT CHOLANGITIS OR CHOLECYSTITIS WITH OBSTRUCTION: ICD-10-CM

## 2024-02-21 LAB — SURGICAL PATHOLOGY STUDY: SIGNIFICANT CHANGE UP

## 2024-05-20 ENCOUNTER — NON-APPOINTMENT (OUTPATIENT)
Age: 39
End: 2024-05-20

## 2024-12-05 ENCOUNTER — EMERGENCY (EMERGENCY)
Facility: HOSPITAL | Age: 39
LOS: 0 days | Discharge: HOME | End: 2024-12-05
Attending: STUDENT IN AN ORGANIZED HEALTH CARE EDUCATION/TRAINING PROGRAM
Payer: MEDICAID

## 2024-12-05 VITALS
WEIGHT: 190.04 LBS | HEART RATE: 112 BPM | OXYGEN SATURATION: 98 % | DIASTOLIC BLOOD PRESSURE: 75 MMHG | RESPIRATION RATE: 15 BRPM | TEMPERATURE: 98 F | SYSTOLIC BLOOD PRESSURE: 111 MMHG

## 2024-12-05 VITALS — HEART RATE: 91 BPM

## 2024-12-05 DIAGNOSIS — O26.891 OTHER SPECIFIED PREGNANCY RELATED CONDITIONS, FIRST TRIMESTER: ICD-10-CM

## 2024-12-05 DIAGNOSIS — R10.84 GENERALIZED ABDOMINAL PAIN: ICD-10-CM

## 2024-12-05 DIAGNOSIS — Z98.890 OTHER SPECIFIED POSTPROCEDURAL STATES: Chronic | ICD-10-CM

## 2024-12-05 DIAGNOSIS — R11.0 NAUSEA: ICD-10-CM

## 2024-12-05 DIAGNOSIS — Z3A.01 LESS THAN 8 WEEKS GESTATION OF PREGNANCY: ICD-10-CM

## 2024-12-05 LAB
ALBUMIN SERPL ELPH-MCNC: 4.6 G/DL — SIGNIFICANT CHANGE UP (ref 3.5–5.2)
ALP SERPL-CCNC: 62 U/L — SIGNIFICANT CHANGE UP (ref 30–115)
ALT FLD-CCNC: 8 U/L — SIGNIFICANT CHANGE UP (ref 0–41)
ANION GAP SERPL CALC-SCNC: 10 MMOL/L — SIGNIFICANT CHANGE UP (ref 7–14)
APPEARANCE UR: CLEAR — SIGNIFICANT CHANGE UP
AST SERPL-CCNC: 15 U/L — SIGNIFICANT CHANGE UP (ref 0–41)
BACTERIA # UR AUTO: NEGATIVE /HPF — SIGNIFICANT CHANGE UP
BASOPHILS # BLD AUTO: 0.01 K/UL — SIGNIFICANT CHANGE UP (ref 0–0.2)
BASOPHILS NFR BLD AUTO: 0.1 % — SIGNIFICANT CHANGE UP (ref 0–1)
BILIRUB SERPL-MCNC: 0.5 MG/DL — SIGNIFICANT CHANGE UP (ref 0.2–1.2)
BILIRUB UR-MCNC: NEGATIVE — SIGNIFICANT CHANGE UP
BLD GP AB SCN SERPL QL: SIGNIFICANT CHANGE UP
BUN SERPL-MCNC: 15 MG/DL — SIGNIFICANT CHANGE UP (ref 10–20)
CALCIUM SERPL-MCNC: 9.3 MG/DL — SIGNIFICANT CHANGE UP (ref 8.4–10.5)
CAST: 2 /LPF — SIGNIFICANT CHANGE UP (ref 0–4)
CHLORIDE SERPL-SCNC: 103 MMOL/L — SIGNIFICANT CHANGE UP (ref 98–110)
CO2 SERPL-SCNC: 22 MMOL/L — SIGNIFICANT CHANGE UP (ref 17–32)
COLOR SPEC: YELLOW — SIGNIFICANT CHANGE UP
CREAT SERPL-MCNC: 0.7 MG/DL — SIGNIFICANT CHANGE UP (ref 0.7–1.5)
DIFF PNL FLD: NEGATIVE — SIGNIFICANT CHANGE UP
EGFR: 114 ML/MIN/1.73M2 — SIGNIFICANT CHANGE UP
EOSINOPHIL # BLD AUTO: 0.03 K/UL — SIGNIFICANT CHANGE UP (ref 0–0.7)
EOSINOPHIL NFR BLD AUTO: 0.3 % — SIGNIFICANT CHANGE UP (ref 0–8)
GLUCOSE SERPL-MCNC: 106 MG/DL — HIGH (ref 70–99)
GLUCOSE UR QL: NEGATIVE MG/DL — SIGNIFICANT CHANGE UP
HCG SERPL-ACNC: 9372 MIU/ML — HIGH
HCT VFR BLD CALC: 41.5 % — SIGNIFICANT CHANGE UP (ref 37–47)
HGB BLD-MCNC: 13.9 G/DL — SIGNIFICANT CHANGE UP (ref 12–16)
IMM GRANULOCYTES NFR BLD AUTO: 0.3 % — SIGNIFICANT CHANGE UP (ref 0.1–0.3)
KETONES UR-MCNC: 15 MG/DL
LEUKOCYTE ESTERASE UR-ACNC: ABNORMAL
LIDOCAIN IGE QN: 23 U/L — SIGNIFICANT CHANGE UP (ref 7–60)
LYMPHOCYTES # BLD AUTO: 0.52 K/UL — LOW (ref 1.2–3.4)
LYMPHOCYTES # BLD AUTO: 5.4 % — LOW (ref 20.5–51.1)
MCHC RBC-ENTMCNC: 32 PG — HIGH (ref 27–31)
MCHC RBC-ENTMCNC: 33.5 G/DL — SIGNIFICANT CHANGE UP (ref 32–37)
MCV RBC AUTO: 95.6 FL — SIGNIFICANT CHANGE UP (ref 81–99)
MONOCYTES # BLD AUTO: 0.66 K/UL — HIGH (ref 0.1–0.6)
MONOCYTES NFR BLD AUTO: 6.9 % — SIGNIFICANT CHANGE UP (ref 1.7–9.3)
NEUTROPHILS # BLD AUTO: 8.3 K/UL — HIGH (ref 1.4–6.5)
NEUTROPHILS NFR BLD AUTO: 87 % — HIGH (ref 42.2–75.2)
NITRITE UR-MCNC: NEGATIVE — SIGNIFICANT CHANGE UP
NRBC # BLD: 0 /100 WBCS — SIGNIFICANT CHANGE UP (ref 0–0)
PH UR: 5.5 — SIGNIFICANT CHANGE UP (ref 5–8)
PLATELET # BLD AUTO: 352 K/UL — SIGNIFICANT CHANGE UP (ref 130–400)
PMV BLD: 10.5 FL — HIGH (ref 7.4–10.4)
POTASSIUM SERPL-MCNC: 4 MMOL/L — SIGNIFICANT CHANGE UP (ref 3.5–5)
POTASSIUM SERPL-SCNC: 4 MMOL/L — SIGNIFICANT CHANGE UP (ref 3.5–5)
PROT SERPL-MCNC: 7.3 G/DL — SIGNIFICANT CHANGE UP (ref 6–8)
PROT UR-MCNC: NEGATIVE MG/DL — SIGNIFICANT CHANGE UP
RBC # BLD: 4.34 M/UL — SIGNIFICANT CHANGE UP (ref 4.2–5.4)
RBC # FLD: 12.8 % — SIGNIFICANT CHANGE UP (ref 11.5–14.5)
RBC CASTS # UR COMP ASSIST: 1 /HPF — SIGNIFICANT CHANGE UP (ref 0–4)
SODIUM SERPL-SCNC: 135 MMOL/L — SIGNIFICANT CHANGE UP (ref 135–146)
SP GR SPEC: 1.03 — SIGNIFICANT CHANGE UP (ref 1–1.03)
SQUAMOUS # UR AUTO: 2 /HPF — SIGNIFICANT CHANGE UP (ref 0–5)
UROBILINOGEN FLD QL: 0.2 MG/DL — SIGNIFICANT CHANGE UP (ref 0.2–1)
WBC # BLD: 9.55 K/UL — SIGNIFICANT CHANGE UP (ref 4.8–10.8)
WBC # FLD AUTO: 9.55 K/UL — SIGNIFICANT CHANGE UP (ref 4.8–10.8)
WBC UR QL: 2 /HPF — SIGNIFICANT CHANGE UP (ref 0–5)

## 2024-12-05 PROCEDURE — 83690 ASSAY OF LIPASE: CPT

## 2024-12-05 PROCEDURE — 80053 COMPREHEN METABOLIC PANEL: CPT

## 2024-12-05 PROCEDURE — 81001 URINALYSIS AUTO W/SCOPE: CPT

## 2024-12-05 PROCEDURE — 99284 EMERGENCY DEPT VISIT MOD MDM: CPT

## 2024-12-05 PROCEDURE — 76815 OB US LIMITED FETUS(S): CPT

## 2024-12-05 PROCEDURE — 86901 BLOOD TYPING SEROLOGIC RH(D): CPT

## 2024-12-05 PROCEDURE — 76815 OB US LIMITED FETUS(S): CPT | Mod: 26

## 2024-12-05 PROCEDURE — 84702 CHORIONIC GONADOTROPIN TEST: CPT

## 2024-12-05 PROCEDURE — 76817 TRANSVAGINAL US OBSTETRIC: CPT | Mod: 26

## 2024-12-05 PROCEDURE — 85025 COMPLETE CBC W/AUTO DIFF WBC: CPT

## 2024-12-05 PROCEDURE — 99284 EMERGENCY DEPT VISIT MOD MDM: CPT | Mod: 25

## 2024-12-05 PROCEDURE — 36415 COLL VENOUS BLD VENIPUNCTURE: CPT

## 2024-12-05 PROCEDURE — 86850 RBC ANTIBODY SCREEN: CPT

## 2024-12-05 PROCEDURE — 76817 TRANSVAGINAL US OBSTETRIC: CPT

## 2024-12-05 PROCEDURE — 86900 BLOOD TYPING SEROLOGIC ABO: CPT

## 2024-12-05 RX ORDER — ACETAMINOPHEN 500MG 500 MG/1
650 TABLET, COATED ORAL ONCE
Refills: 0 | Status: COMPLETED | OUTPATIENT
Start: 2024-12-05 | End: 2024-12-05

## 2024-12-05 RX ADMIN — ACETAMINOPHEN 500MG 650 MILLIGRAM(S): 500 TABLET, COATED ORAL at 09:02

## 2024-12-05 NOTE — ED PROVIDER NOTE - CLINICAL SUMMARY MEDICAL DECISION MAKING FREE TEXT BOX
39 year old female  lmp 10/22/24 w/ outpatient [facility] blood work from  HCG 2586 presenting here w/ generalized abdominal pain and cramping. No vaginal bleeding. vs reviewed labs imaging obtained and reviewed Yolk sac seen on ultrasound. All results discused with patient PAtient requested to terminate pregnancy, recommended f.u with OBGYN clinic. Return precautions provided.

## 2024-12-05 NOTE — ED PROVIDER NOTE - PHYSICAL EXAMINATION
Initial vital signs reviewed.  General: NAD, nontoxic appearing.  HENT: AT/NC.  Eyes: non-injected conjunctivae b/l.  Neck: supple.  CV: RRR, no murmurs. 2+ distal pulses x4.  Pulm: nonlabored work of breathing, CTAB.  Abd: soft, nondistended, nontender, nongravid.  MSK: no joint deformity.  Skin: warm, dry, well-perfused.  Neuro: A&Ox4.  Psych: appropriate mood and affect.

## 2024-12-05 NOTE — ED PROVIDER NOTE - ATTENDING CONTRIBUTION TO CARE
I personally evaluated the patient. I reviewed the Resident’s or Physician Assistant’s note (as assigned above), and agree with the findings and plan except as documented in my note.    39 year old female  lmp 10/22/24 w/ outpatient [facility] work from  HCG 2586 presenting here w/ generalized abdominal pain and cramping. No vaginal bleeding.  CONSTITUTIONAL: WA / WN / NAD  HEAD: NCAT  EYES: PERRL; EOMI;   NECK: Supple  ABD: Soft, NT ND  MSK/EXT: No gross deformities; full range of motion.  SKIN: Warm and dry;   NEURO: AAOx3,  PSYCH: Memory Intact, Normal Affect

## 2024-12-05 NOTE — ED PROVIDER NOTE - NSFOLLOWUPINSTRUCTIONS_ED_ALL_ED_FT
Our Emergency Department Referral Coordinators will be reaching out to you in the next 24-48 hours from 9:00am to 5:00pm with a follow up appointment. Please expect a phone call from the hospital in that time frame. If you do not receive a call or if you have any questions or concerns, you can reach them at   [phone]    First Trimester of Pregnancy  A pregnant person during the first trimester.  The first trimester of pregnancy starts on the first day of your last monthly period until the end of week 13. This is months 1 through 3 of pregnancy. A week after a sperm fertilizes an egg, the egg will implant into the wall of the uterus and begin to develop into a baby.    Body changes during your first trimester  Your body goes through many changes during pregnancy. The changes usually return to normal after your baby is born.    Physical changes    Your breasts may grow larger and may hurt. The area around your nipples may get darker.  Your periods will stop.  Your hair and nails may grow faster.  You may pee more often.  Health changes    You may tire easily.  Your gums may bleed and may be sensitive when you brush and floss.  You may not feel hungry.  You may have heartburn.  You may throw up or feel like you may throw up.  You may want to eat some foods, but not others.  You may have headaches.  You may have trouble pooping (constipation).  Other changes    Your emotions may change from day to day.  You may have more dreams.  Follow these instructions at home:  Medicines    Talk to your health care provider if you're taking medicines. Ask if the medicines are safe to take during pregnancy.  Your provider may change the medicines that you take.  Do not take any medicines unless told to by your provider.  Take a prenatal vitamin that has at least 600 micrograms (mcg) of folic acid.  Do not use herbal medicines, illegal substances, or medicines that are not approved by your provider.  Eating and drinking    While you're pregnant your body needs extra food for your growing baby. Talk with your provider about what to eat while pregnant.    Activity    Most women are able to exercise during pregnancy. Exercises may need to change as your pregnancy goes on.  Talk to your provider about your activities and exercise routines.  Relieving pain and discomfort    Wear a good, supportive bra if your breasts hurt.  Rest with your legs raised if you have leg cramps or low back pain.  Safety    Wear your seatbelt at all times when you're in a car.  Talk to your provider if someone hits you, hurts you, or yells at you.  Talk with your provider if you're feeling sad or have thoughts of hurting yourself.  Lifestyle    Certain things can be harmful while you're pregnant. Follow these rules:  Do not use hot tubs, steam rooms, or saunas.  Do not douche. Do not use tampons or scented pads.  Do not drink alcohol,smoke, vape, or use products with nicotine or tobacco in them. If you need help quitting, talk with your provider.  Avoid cat litter boxes and soil used by cats. These things carry germs that can cause harm to your pregnancy and your baby.  General instructions    Keep all follow-up visits. It helps you and your unborn baby stay as healthy as possible. Write down your questions. Take them to your visits. Your provider will:  Talk with you about your overall health.  Give you advice or refer you to specialists who can help with different needs, including:  Prenatal education classes.  Mental health and counseling.  Foods and healthy eating. Ask for help if you need help with food.  Call your dentist and ask to be seen. Brush your teeth with a soft toothbrush. Floss gently.  Where to find more information  American Pregnancy Association: americanpregnancy.org  American College of Obstetricians and Gynecologists: acog.org  Office on Women's Health: womenshealth.gov  Contact a health care provider if:  You feel dizzy, faint, or have a fever.  You vomit or have watery poop (diarrhea) for 2 days or more.  You have abnormal discharge or bleeding from your vagina.  You have pain when you pee or your pee smells bad.  You have cramps, pain, or pressure in your belly area.  Get help right away if:  You have trouble breathing or chest pain.  You have any kind of injury, such as from a fall or a car crash.  These symptoms may be an emergency. Get help right away. Call 911.  Do not wait to see if the symptoms will go away.  Do not drive yourself to the hospital.  This information is not intended to replace advice given to you by your health care provider. Make sure you discuss any questions you have with your health care provider.

## 2024-12-05 NOTE — ED PROVIDER NOTE - PATIENT PORTAL LINK FT
You can access the FollowMyHealth Patient Portal offered by [facility] by registering at the following website: [email or web address]. By joining [facility]’s FollowMyHealth portal, you will also be able to view your health information using other applications (apps) compatible with our system.

## 2024-12-05 NOTE — ED ADULT TRIAGE NOTE - CHIEF COMPLAINT QUOTE
"Im pregnant and in a lot of pain so they told me to come in because it might be an ectopic." Unknown how many weeks she is, denies vaginal bleeding.

## 2024-12-05 NOTE — ED PROVIDER NOTE - NSFOLLOWUPCLINICS_GEN_ALL_ED_FT
[facility]  OB/GYN  [address] [place] [address]  Phone: [phone]  Fax:   Follow Up Time: 1-3 Days

## 2024-12-05 NOTE — ED PROVIDER NOTE - OBJECTIVE STATEMENT
39yoF  LMP 10/22/2024 (hCG on 2024 = 2586), without other significant pmhx, who presents for few days of generalized abd pain greater on rlq. describes as crampy aching pain and was told to come in concern for ectopic. Had attempted ultrasound in clinic last weekend unable to visualize pregnancy. Reports nausea without vomiting. also reports multiple episodes of loose stools yesterday. denies any vagina bleeding, discharge, gushing of fluids, fever, dysuria, dyspnea, numbness.

## 2024-12-06 ENCOUNTER — NON-APPOINTMENT (OUTPATIENT)
Age: 39
End: 2024-12-06

## 2024-12-10 ENCOUNTER — NON-APPOINTMENT (OUTPATIENT)
Age: 39
End: 2024-12-10

## 2024-12-12 ENCOUNTER — APPOINTMENT (OUTPATIENT)
Dept: OBGYN | Facility: CLINIC | Age: 39
End: 2024-12-12

## 2025-01-28 NOTE — ED PROVIDER NOTE - HIV OFFER
Patient : Paris Solomon Age: 80 year old Sex: female   MRN: [id number] Encounter Date: 1/28/2025    History     Chief Complaint   Patient presents with    Shortness of Breath    Altered Mental Status       HPI    Paris Solomon is a 80 year old female with a history of GERD DM2 presented emergency room for evaluation of fever cough and altered mental status.  History mostly provided by her daughter at bedside.  Patient states that she developed rhinorrhea and nasal congestion 4 days ago and congested cough since yesterday with occasional productive with greenish-yellow yellowish phlegm.  Today over the phone as she did not sound have normal according to her daughter.  When she went to visit her patient appeared to be slightly confused.  She was noted to be febrile, and with oxygen saturation of 88% pulse oximetry at home she did complain of being short of breath.  Due that  she presented to the emergency room for further evaluation.  Patient lives alone.  Does deny known sick contact or recent hospitalization.  Denies lateral leg swelling.  Denies abdominal pain or urinary symptoms.  Denies having pain in the chest.  She is not a smoker.          Past/Family/Social History     Allergies   Allergen Reactions    Actos [Pioglitazone Hydrochloride]      Blurred vision    Effexor [Venlafaxine Hydrochloride] HEADACHES    Levaquin DIZZINESS     Blurry vision, irritablity    Nitrofurantoin NAUSEA    Sulfa Antibiotics NAUSEA       Current Facility-Administered Medications   Medication    guaiFENesin-DM (ROBITUSSIN DM) 100-10 MG/5ML syrup 10 mL    aspirin (ECOTRIN) enteric coated tablet 81 mg    ezetimibe (ZETIA) tablet 10 mg    famotidine (PEPCID) tablet 20 mg    lisinopril (ZESTRIL) tablet 2.5 mg    magnesium oxide (MAG-OX) tablet 400 mg    pantoprazole (PROTONIX) EC tablet 40 mg    cycloSPORINE (RESTASIS) 0.05 % ophthalmic emulsion 1 drop    rosuvastatin (CRESTOR) tablet 40 mg    ondansetron (ZOFRAN) injection 4 mg     prochlorperazine (COMPAZINE) tablet 5 mg    prochlorperazine (COMPAZINE) injection 5 mg    acetaminophen (TYLENOL) tablet 650 mg    polyethylene glycol (MIRALAX) packet 17 g    docusate sodium-sennosides (SENOKOT S) 50-8.6 MG 2 tablet    bisacodyl (DULCOLAX) suppository 10 mg    magnesium hydroxide (MILK OF MAGNESIA) 400 MG/5ML suspension 30 mL    aluminum-magnesium hydroxide-simethicone (MAALOX) 200-200-20 MG/5ML suspension 30 mL    enoxaparin (LOVENOX) injection 40 mg    Potassium Standard Replacement Protocol (Levels 3.5 and lower)    Potassium Replacement (Levels 3.6 - 4)    Magnesium Standard Replacement Protocol    Phosphorus Standard Replacement Protocol    HYDROcodone-acetaminophen (NORCO) 5-325 MG per tablet 1 tablet    predniSONE (DELTASONE) tablet 40 mg    oseltamivir (TAMIFLU) capsule 75 mg    budesonide (PULMICORT) nebulizer suspension 0.5 mg    ipratropium-albuterol (DUONEB) 0.5-2.5 (3) MG/3ML nebulizer solution 3 mL    ipratropium-albuterol (DUONEB) 0.5-2.5 (3) MG/3ML nebulizer solution 3 mL    dextrose 50 % injection 25 g    dextrose 50 % injection 12.5 g    glucagon (GLUCAGEN) injection 1 mg    dextrose (GLUTOSE) 40 % gel 15 g    dextrose (GLUTOSE) 40 % gel 30 g    insulin lispro (ADMELOG,HumaLOG) - Correction Dose       Past Medical History:   Diagnosis Date    Allergy     Chronic rhinitis     Diabetes mellitus, type 2  (CMD)     TIP (generalized anxiety disorder)     GERD (gastroesophageal reflux disease)     Kidney stone     Kidney stone     Knee pain     bilat, right more than left    Obesity, unspecified     Osteopenia 02/27/2013    Osteoporosis     Other and unspecified hyperlipidemia     PONV (postoperative nausea and vomiting)     Type II or unspecified type diabetes mellitus without mention of complication, not stated as uncontrolled     Unspecified essential hypertension     Urinary tract infection, site not specified 01/15/2014       Past Surgical History:   Procedure Laterality Date     Ankle brachial index  2008    Ankle fracture surgery Right 2018    open reduction internal fixation    Breast biopsy Left     Benign      date unknown-    Cardiac catherization  2015    Closed reduction distal radius fracture  10/26/2010    Left    Colonoscopy  2018    Colonoscopy diagnostic  2013    Dexa bone density axial skeleton  2013    Echocardiogram  2009    Esophagogastroduodenoscopy  2018    Fracture surgery  2009    bilateral wrist fractures    Orif wrist fracture  2002    Right    Stress test  3/11/15, 09, 05    Vaginal hysterectomy  1994    oophorectomy       Family History   Problem Relation Age of Onset    Miscarriages / Stillbirths Mother     Arrhythmia Mother     Cancer Father     COPD Father     Hypertension Father     Hyperlipidemia Father     Myocardial Infarction Father     Learning disability Sister     Diabetes Brother     Kidney disease Brother     Asthma Son     Learning disability Son     Cancer, Breast Maternal Grandmother     Heart disease Maternal Grandfather     Asthma Paternal Uncle        Social History     Tobacco Use    Smoking status: Former     Current packs/day: 0.00     Average packs/day: 1 pack/day for 31.0 years (31.0 ttl pk-yrs)     Types: Cigarettes     Start date: 1961     Quit date: 1990     Years since quittin.1    Smokeless tobacco: Never   Vaping Use    Vaping status: never used   Substance Use Topics    Alcohol use: Not Currently     Alcohol/week: 0.0 - 2.0 standard drinks of alcohol    Drug use: No          Review of Systems   Review of Symptoms     Review of Systems   Constitutional:  Positive for fatigue and fever. Negative for activity change, appetite change and diaphoresis.   Respiratory:  Positive for cough and shortness of breath. Negative for chest tightness and wheezing.    Cardiovascular:  Negative for chest pain and palpitations.   Gastrointestinal:  Negative for  abdominal pain, blood in stool, nausea and vomiting.   Endocrine: Negative for polydipsia.   Genitourinary:  Negative for frequency.   Musculoskeletal:  Negative for back pain and neck pain.   Skin:  Negative for rash.   Allergic/Immunologic: Negative for immunocompromised state.   Neurological:  Positive for weakness. Negative for dizziness, light-headedness, numbness and headaches.   Hematological:  Does not bruise/bleed easily.   Psychiatric/Behavioral:  Negative for agitation.           Physical Exam   Physical Exam     ED Triage Vitals   ED Triage Vitals Group      Temp 01/28/25 1746 100.2 °F (37.9 °C)      Heart Rate 01/28/25 1746 94      Resp 01/28/25 1746 (!) 22      BP 01/28/25 1746 (!) 146/69      SpO2 01/28/25 1743 (!) 88 %      EtCO2 mmHg 01/28/25 1758 0 mmHg      Height 01/28/25 1746 5' 7\" (1.702 m)      Weight 01/28/25 1746 179 lb 10.8 oz (81.5 kg)      Weight Scale Used 01/28/25 1746 Scale in bed      BMI (Calculated) 01/28/25 1746 28.14      IBW/kg (Calculated) 01/28/25 1746 61.6       Physical Exam  Vitals reviewed.   Constitutional:       Appearance: Normal appearance. She is not diaphoretic.      Comments: Sick appearing, slightly somnolent.   HENT:      Head: Atraumatic.      Nose: No congestion or rhinorrhea.      Mouth/Throat:      Mouth: Mucous membranes are moist.   Eyes:      Extraocular Movements: Extraocular movements intact.      Pupils: Pupils are equal, round, and reactive to light.   Cardiovascular:      Rate and Rhythm: Normal rate and regular rhythm.      Heart sounds: No murmur heard.  Pulmonary:      Comments: Slight increased work of breathing but lung sounds are clear   chest expansion metrical  Abdominal:      General: Abdomen is flat. Bowel sounds are normal. There is no distension.      Palpations: Abdomen is soft.      Tenderness: There is no abdominal tenderness.   Musculoskeletal:         General: Normal range of motion.      Cervical back: Normal range of motion and neck  supple. No tenderness.      Comments: No edema or focal tenderness,   Skin:     Capillary Refill: Capillary refill takes less than 2 seconds.      Comments: Skin unremarkable for evidence of lesions.   Neurological:      General: No focal deficit present.      Mental Status: She is alert and oriented to person, place, and time.            Procedures   ED Procedures     Procedures     Lab Results   ED Lab   Results for orders placed or performed during the hospital encounter of 01/28/25   Basic Metabolic Panel    Specimen: Blood, Venous   Result Value Ref Range    Fasting Status      Sodium 139 135 - 145 mmol/L    Potassium 3.7 3.4 - 5.1 mmol/L    Chloride 102 97 - 110 mmol/L    Carbon Dioxide 25 21 - 32 mmol/L    Anion Gap 16 7 - 19 mmol/L    Glucose 123 (H) 70 - 99 mg/dL    BUN 17 6 - 20 mg/dL    Creatinine 0.84 0.51 - 0.95 mg/dL    Glomerular Filtration Rate 71 >=60    BUN/Cr 20 7 - 25    Calcium 9.4 8.4 - 10.2 mg/dL   NT proBNP    Specimen: Blood, Venous   Result Value Ref Range    NT-proBNP 778 (H) <=450 pg/mL   Hepatic Function Panel    Specimen: Blood, Venous   Result Value Ref Range    Albumin 4.0 3.4 - 5.0 g/dL    Bilirubin, Total 0.7 0.2 - 1.0 mg/dL    Bilirubin, Direct 0.2 0.0 - 0.2 mg/dL    Alkaline Phosphatase 47 45 - 117 Units/L    GPT/ALT 31 <64 Units/L    GOT/AST 26 <=37 Units/L    Protein, Total 7.4 6.4 - 8.2 g/dL   Lipase    Specimen: Blood, Venous   Result Value Ref Range    Lipase 39 15 - 77 Units/L   Magnesium    Specimen: Blood, Venous   Result Value Ref Range    Magnesium 2.2 1.7 - 2.4 mg/dL   CBC with Automated Differential (performable only)    Specimen: Blood, Venous   Result Value Ref Range    WBC 7.2 4.2 - 11.0 K/mcL    RBC 4.88 4.00 - 5.20 mil/mcL    HGB 14.0 12.0 - 15.5 g/dL    HCT 41.9 36.0 - 46.5 %    MCV 85.9 78.0 - 100.0 fl    MCH 28.7 26.0 - 34.0 pg    MCHC 33.4 32.0 - 36.5 g/dL    RDW-CV 14.5 11.0 - 15.0 %    RDW-SD 45.6 39.0 - 50.0 fL     140 - 450 K/mcL    NRBC 0 <=0 /100  WBC    Neutrophil, Percent 83 %    Lymphocytes, Percent 6 %    Mono, Percent 10 %    Eosinophils, Percent 0 %    Basophils, Percent 1 %    Immature Granulocytes 0 %    Absolute Neutrophils 5.9 1.8 - 7.7 K/mcL    Absolute Lymphocytes 0.5 (L) 1.0 - 4.0 K/mcL    Absolute Monocytes 0.7 0.3 - 0.9 K/mcL    Absolute Eosinophils  0.0 0.0 - 0.5 K/mcL    Absolute Basophils 0.1 0.0 - 0.3 K/mcL    Absolute Immature Granulocytes 0.0 0.0 - 0.2 K/mcL   Procalcitonin    Specimen: Blood, Venous   Result Value Ref Range    Procalcitonin <0.05 <0.10 ng/mL   COVID/Flu/RSV panel    Specimen: Nasal, Mid-turbinate; Swab   Result Value Ref Range    Rapid SARS-COV-2 by PCR Not Detected Not Detected / Detected / Presumptive Positive / Inhibitors present    Influenza A by PCR Detected (A) Not Detected    Influenza B by PCR Not Detected Not Detected    RSV BY PCR Not Detected Not Detected    Isolation Guidelines      Procedural Comment     D Dimer, Quantitative    Specimen: Blood, Venous   Result Value Ref Range    D Dimer, Quantitative 0.30 <0.57 mg/L (FEU)   Basic Metabolic Panel    Specimen: Blood, Venous   Result Value Ref Range    Fasting Status      Sodium 139 135 - 145 mmol/L    Potassium 4.4 3.4 - 5.1 mmol/L    Chloride 105 97 - 110 mmol/L    Carbon Dioxide 22 21 - 32 mmol/L    Anion Gap 16 7 - 19 mmol/L    Glucose 211 (H) 70 - 99 mg/dL    BUN 20 6 - 20 mg/dL    Creatinine 0.71 0.51 - 0.95 mg/dL    Glomerular Filtration Rate 87 >=60    BUN/Cr 28 (H) 7 - 25    Calcium 8.9 8.4 - 10.2 mg/dL   CBC with Automated Differential (performable only)    Specimen: Blood, Venous   Result Value Ref Range    WBC 4.8 4.2 - 11.0 K/mcL    RBC 4.52 4.00 - 5.20 mil/mcL    HGB 13.0 12.0 - 15.5 g/dL    HCT 39.7 36.0 - 46.5 %    MCV 87.8 78.0 - 100.0 fl    MCH 28.8 26.0 - 34.0 pg    MCHC 32.7 32.0 - 36.5 g/dL    RDW-CV 14.6 11.0 - 15.0 %    RDW-SD 47.3 39.0 - 50.0 fL     140 - 450 K/mcL    NRBC 0 <=0 /100 WBC    Neutrophil, Percent 88 %     Lymphocytes, Percent 9 %    Mono, Percent 2 %    Eosinophils, Percent 0 %    Basophils, Percent 0 %    Immature Granulocytes 1 %    Absolute Neutrophils 4.2 1.8 - 7.7 K/mcL    Absolute Lymphocytes 0.5 (L) 1.0 - 4.0 K/mcL    Absolute Monocytes 0.1 (L) 0.3 - 0.9 K/mcL    Absolute Eosinophils  0.0 0.0 - 0.5 K/mcL    Absolute Basophils 0.0 0.0 - 0.3 K/mcL    Absolute Immature Granulocytes 0.0 0.0 - 0.2 K/mcL   Gold Top    Specimen: Blood, Venous   Result Value Ref Range    Extra Tube Hold for Add Ons    Light Blue Top    Specimen: Blood, Venous   Result Value Ref Range    Extra Tube Hold for Add Ons    TROPONIN I, HIGH SENSITIVITY    Specimen: Blood, Venous   Result Value Ref Range    Troponin I, High Sensitivity 11 <52 ng/L   Lavender Top    Specimen: Blood, Venous   Result Value Ref Range    Extra Tube Hold for Add Ons    Blood Culture    Specimen: Blood   Result Value Ref Range    Culture, Blood or Bone Marrow No Growth 2 Days.    Blood Culture    Specimen: Blood   Result Value Ref Range    Culture, Blood or Bone Marrow No Growth 2 Days.    LACTIC ACID VENOUS WITH REFLEX - POINT OF CARE    Specimen: Blood, Venous   Result Value Ref Range    LACTIC ACID, VENOUS - POINT OF CARE 1.0 <2.0 mmol/L   GLUCOSE, BEDSIDE - POINT OF CARE    Specimen: Blood   Result Value Ref Range    GLUCOSE, BEDSIDE - POINT OF CARE 170 (H) 70 - 99 mg/dL   GLUCOSE, BEDSIDE - POINT OF CARE    Specimen: Blood   Result Value Ref Range    GLUCOSE, BEDSIDE - POINT OF CARE 221 (H) 70 - 99 mg/dL   GLUCOSE, BEDSIDE - POINT OF CARE    Specimen: Blood   Result Value Ref Range    GLUCOSE, BEDSIDE - POINT OF CARE 225 (H) 70 - 99 mg/dL   GLUCOSE, BEDSIDE - POINT OF CARE    Specimen: Blood   Result Value Ref Range    GLUCOSE, BEDSIDE - POINT OF CARE 214 (H) 70 - 99 mg/dL   GLUCOSE, BEDSIDE - POINT OF CARE    Specimen: Blood   Result Value Ref Range    GLUCOSE, BEDSIDE - POINT OF CARE 172 (H) 70 - 99 mg/dL   GLUCOSE, BEDSIDE - POINT OF CARE    Specimen: Blood    Result Value Ref Range    GLUCOSE, BEDSIDE - POINT OF CARE 117 (H) 70 - 99 mg/dL   GLUCOSE, BEDSIDE - POINT OF CARE    Specimen: Blood   Result Value Ref Range    GLUCOSE, BEDSIDE - POINT OF CARE 121 (H) 70 - 99 mg/dL   GLUCOSE, BEDSIDE - POINT OF CARE    Specimen: Blood   Result Value Ref Range    GLUCOSE, BEDSIDE - POINT OF CARE 180 (H) 70 - 99 mg/dL   GLUCOSE, BEDSIDE - POINT OF CARE    Specimen: Blood   Result Value Ref Range    GLUCOSE, BEDSIDE - POINT OF CARE 157 (H) 70 - 99 mg/dL   GLUCOSE, BEDSIDE - POINT OF CARE    Specimen: Blood   Result Value Ref Range    GLUCOSE, BEDSIDE - POINT OF CARE 110 (H) 70 - 99 mg/dL   GLUCOSE, BEDSIDE - POINT OF CARE    Specimen: Blood   Result Value Ref Range    GLUCOSE, BEDSIDE - POINT OF CARE 122 (H) 70 - 99 mg/dL   GLUCOSE, BEDSIDE - POINT OF CARE    Specimen: Blood   Result Value Ref Range    GLUCOSE, BEDSIDE - POINT OF CARE 161 (H) 70 - 99 mg/dL          EKG     EKG Interpretation  Rate: 90  Rhythm: normal sinus rhythm   Abnormality: Right bundle branch block, this is not new, .  No ischemic changes.  TN intervals are QTc within normal        Radiology Results   ED Radiology Results     Imaging Results              XR CHEST AP OR PA (Final result)  Result time 01/28/25 18:52:23      Final result                   Impression:    IMPRESSION:     1.  Bilateral lower lobe atelectases.    Electronically Signed by: Jennifer Correa MD  Signed on: 1/28/2025 6:52 PM  Created on Workstation ID: [id number]  Signed on Workstation ID: [id number]               Narrative:    XR CHEST AP OR PA    HISTORY:  . SOB   SOB    COMPARISON: 11/4/2024    TECHNIQUE:  Single, AP upright view of the chest.    FINDINGS:    There is poor inspiration.    The cardiomediastinal contour and pulmonary vasculature are within normal  limits. Atelectasis is seen both lower lobes. No definite pleural effusion.  No pneumothorax.    The visualized osseous structures demonstrate no acute abnormality.                                            ED Medications   ED Medications     ED Medication Orders (From admission, onward)      Ordered Start     Status Ordering Provider    01/28/25 1917 01/28/25 1918  methylPREDNISolone (SOLU-Medrol) injection 100 mg  ONCE         Last MAR action: Given JING RAO    01/28/25 1917 01/28/25 1918  ipratropium-albuterol (DUONEB) 0.5-2.5 (3) MG/3ML nebulizer solution 3 mL  ONCE         Last MAR action: Given JENNIFERCHARLOTTE SUTTONJEZ NESTOR    01/28/25 1849 01/28/25 1850  sodium chloride (NORMAL SALINE) 0.9 % bolus 1,000 mL  ONCE         Last MAR action: Completed JENNIFERCHARLOTTE SUTTONJEZ NESTOR    01/28/25 1825 01/28/25 1826  acetaminophen (TYLENOL) tablet 1,000 mg  ONCE         Last MAR action: Given JENNIFERLESLEY JING NESTOR    01/28/25 1825 01/28/25 1826  ibuprofen (MOTRIN) tablet 800 mg  ONCE         Last MAR action: Given JENNIFERCHARLOTTE SUTTONJEZ NESTOR    01/28/25 1825 01/28/25 1826  ondansetron (ZOFRAN) injection 4 mg  ONCE         Last MAR action: Given JENNIFERLESLEY JING A                 ED Course     Vitals:    01/30/25 2129 01/30/25 2330 01/31/25 0814 01/31/25 1612   BP: 125/72 (!) 149/85 109/68 125/72   BP Location: LUE - Left upper extremity LUE - Left upper extremity LUE - Left upper extremity LUE - Left upper extremity   Patient Position: Semi-Rosario's Semi-Rosario's Semi-Rosario's Sitting   Pulse: 67 84 78 70   Resp: 16 16 18 18   Temp: 97.9 °F (36.6 °C) 98.1 °F (36.7 °C) 98.1 °F (36.7 °C) 97.9 °F (36.6 °C)   TempSrc: Oral Oral Oral Oral   SpO2: 92% 91% 91% 95%   Weight:       Height:           ED Course as of 01/31/25 1705   Tue Jan 28, 2025   1902 X-ray of the chest unremarkable for evidence of focal consolidations reflective pneumonia pneumothorax and or pleural effusion.  Lactic acid not elevated, CBC BMP within normal.  Procalcitonin not elevated.  Magnesium 2.2 which is normal.  COVID influenza RSV pending, urinalysis pending.  Will add a D-dimer now to rule out pulm embolism given  shortness of breath and hypoxia. [NM]   1926 On  reevaluation patient tells me she feels significantly better.  Daughter at bedside stated that patient in fact has history of DVT provoked after surgery several years ago as well as history of COPD.  Due to this, we will add a D-dimer to rule out potential pulmonary embolism.  DuoNeb as well as Solu-Medrol is given.  May be her symptoms due to mild COPD exacerbation , and hypoxemic respiratory failure due to mild COPD exacerbation [NM]   1943 DDIMER, QUANTITATIVE: 0.30 [CB]   1943 INFLUENZA A BY PCR(!): Detected [CB]      ED Course User Index  [CB] Grant Prieto,   [NM] Brandon Sorenson,        Independent Review Completed in ED course        Consults                    Medical Decision Making                           6:51 PM  Patient 80-year-old male female with no known many chronic medical condition comes to the ED with complaint of shortness of breath associate with cough and rhinorrhea over the last several days.  Noted to be febrile at home and hypoxia of 88 as reported to us by daughter who is at bedside.  Here in ED patient appears to be mildly sick, tachypneic as well as hypoxic at presentation with oxygen saturation 88%.  Body temperature 100.2 °F does not appear disoriented or have active altered mental status.  Treated with 2L of oxygen per NC which did improve into the low 90s.  She has a clear lung sounds and physical examination otherwise unremarkable.  Differential diagnosis including infectious encephalopathy, secondary to COVID versus RSV versus influenza, bacterial pneumonia, , UTI,.  Also considered dehydration, acute metabolic derangement. Low clinical  suspicion for PE and or ACS at this time.  Will proceed with sepsis workup with CBC BMP, LFTs, x-ray of the chest, COVID first RSV.  blood cultures lactic acid and procalcitonin.  Tylenol ibuprofen were given for fever,.    Case turned over to Dr. Prieto at shift change pending return of above diagnostic tests.    Does the Patient have  sepsis: NO     Critical Care               Disposition     Patient care signed out to Dr. Prieto at the end of my shift. Sign-out included relevant details of history, physical, and work-up to date. Will follow up on that test results and reassessment, and plan of care.            Admit 1/28/2025  9:00 PM  Telemetry Bed?: No  Admitting Physician: DAINA BOLTON [[id number]]  Is this a telephone or verbal order?: This is a telephone order from the admitting physician                   Brandon Sorenson,   01/28/25 3099       Brandon Sorenson,   01/31/25 1496     Opt out